# Patient Record
Sex: FEMALE | Race: WHITE | Employment: FULL TIME | URBAN - METROPOLITAN AREA
[De-identification: names, ages, dates, MRNs, and addresses within clinical notes are randomized per-mention and may not be internally consistent; named-entity substitution may affect disease eponyms.]

---

## 2017-01-09 DIAGNOSIS — J45.40 MODERATE PERSISTENT ASTHMA, UNCOMPLICATED: ICD-10-CM

## 2017-01-11 RX ORDER — FLUTICASONE PROPIONATE 220 UG/1
AEROSOL, METERED RESPIRATORY (INHALATION)
Qty: 1 INHALER | Refills: 4 | Status: SHIPPED | OUTPATIENT
Start: 2017-01-11 | End: 2017-07-15 | Stop reason: SDUPTHER

## 2017-02-21 ENCOUNTER — HOSPITAL ENCOUNTER (OUTPATIENT)
Dept: WOMENS IMAGING | Age: 52
Discharge: HOME | End: 2017-02-21
Attending: OBSTETRICS & GYNECOLOGY | Admitting: OBSTETRICS & GYNECOLOGY

## 2017-02-21 DIAGNOSIS — Z12.31 VISIT FOR SCREENING MAMMOGRAM: ICD-10-CM

## 2017-05-22 DIAGNOSIS — J45.40 MODERATE PERSISTENT ASTHMA, UNCOMPLICATED: ICD-10-CM

## 2017-06-11 DIAGNOSIS — L71.9 ROSACEA: ICD-10-CM

## 2017-06-12 RX ORDER — SPIRONOLACTONE 25 MG/1
TABLET ORAL
Qty: 30 TABLET | Refills: 1 | Status: SHIPPED | OUTPATIENT
Start: 2017-06-12 | End: 2017-07-15 | Stop reason: SDUPTHER

## 2017-07-15 ENCOUNTER — OFFICE VISIT (OUTPATIENT)
Dept: FAMILY MEDICINE CLINIC | Age: 52
End: 2017-07-15

## 2017-07-15 VITALS
RESPIRATION RATE: 16 BRPM | DIASTOLIC BLOOD PRESSURE: 80 MMHG | WEIGHT: 157 LBS | SYSTOLIC BLOOD PRESSURE: 122 MMHG | BODY MASS INDEX: 26.8 KG/M2 | HEART RATE: 84 BPM | TEMPERATURE: 98.5 F | HEIGHT: 64 IN

## 2017-07-15 DIAGNOSIS — J45.40 MODERATE PERSISTENT ASTHMA, UNCOMPLICATED: ICD-10-CM

## 2017-07-15 DIAGNOSIS — E78.1 HYPERTRIGLYCERIDEMIA: ICD-10-CM

## 2017-07-15 DIAGNOSIS — J45.40 MODERATE PERSISTENT ASTHMA WITHOUT COMPLICATION: ICD-10-CM

## 2017-07-15 DIAGNOSIS — H10.13 ALLERGIC CONJUNCTIVITIS OF BOTH EYES: ICD-10-CM

## 2017-07-15 DIAGNOSIS — Z00.00 WELL ADULT HEALTH CHECK: Primary | ICD-10-CM

## 2017-07-15 DIAGNOSIS — J30.9 ALLERGIC RHINITIS, UNSPECIFIED ALLERGIC RHINITIS TRIGGER, UNSPECIFIED RHINITIS SEASONALITY: ICD-10-CM

## 2017-07-15 DIAGNOSIS — L71.9 ROSACEA: ICD-10-CM

## 2017-07-15 DIAGNOSIS — Z11.59 NEED FOR HEPATITIS C SCREENING TEST: ICD-10-CM

## 2017-07-15 DIAGNOSIS — Z11.4 SCREENING FOR HIV (HUMAN IMMUNODEFICIENCY VIRUS): ICD-10-CM

## 2017-07-15 DIAGNOSIS — R63.5 WEIGHT GAIN: ICD-10-CM

## 2017-07-15 DIAGNOSIS — R13.14 PHARYNGOESOPHAGEAL DYSPHAGIA: ICD-10-CM

## 2017-07-15 PROCEDURE — 99396 PREV VISIT EST AGE 40-64: CPT | Performed by: FAMILY MEDICINE

## 2017-07-15 RX ORDER — FLUTICASONE PROPIONATE 220 UG/1
2 AEROSOL, METERED RESPIRATORY (INHALATION) 2 TIMES DAILY
Qty: 1 INHALER | Refills: 11 | Status: SHIPPED | OUTPATIENT
Start: 2017-07-15 | End: 2018-01-08

## 2017-07-15 RX ORDER — OMEPRAZOLE 40 MG/1
40 CAPSULE, DELAYED RELEASE ORAL DAILY
Qty: 90 CAPSULE | Refills: 3 | Status: SHIPPED | OUTPATIENT
Start: 2017-07-15 | End: 2018-02-16

## 2017-07-15 RX ORDER — SPIRONOLACTONE 25 MG/1
25 TABLET ORAL DAILY
Qty: 90 TABLET | Refills: 3 | Status: SHIPPED | OUTPATIENT
Start: 2017-07-15 | End: 2018-08-09 | Stop reason: SDUPTHER

## 2017-07-15 ASSESSMENT — PATIENT HEALTH QUESTIONNAIRE - PHQ9
2. FEELING DOWN, DEPRESSED OR HOPELESS: 1
1. LITTLE INTEREST OR PLEASURE IN DOING THINGS: 1
SUM OF ALL RESPONSES TO PHQ9 QUESTIONS 1 & 2: 2
SUM OF ALL RESPONSES TO PHQ QUESTIONS 1-9: 2

## 2017-08-01 DIAGNOSIS — E78.1 HYPERTRIGLYCERIDEMIA: ICD-10-CM

## 2017-08-01 DIAGNOSIS — R63.5 WEIGHT GAIN: ICD-10-CM

## 2017-08-01 DIAGNOSIS — Z11.59 NEED FOR HEPATITIS C SCREENING TEST: ICD-10-CM

## 2017-08-01 DIAGNOSIS — Z11.4 SCREENING FOR HIV (HUMAN IMMUNODEFICIENCY VIRUS): ICD-10-CM

## 2017-08-01 LAB
A/G RATIO: 1.6 (ref 1.1–2.2)
ALBUMIN SERPL-MCNC: 4.4 G/DL (ref 3.4–5)
ALP BLD-CCNC: 79 U/L (ref 40–129)
ALT SERPL-CCNC: 13 U/L (ref 10–40)
ANION GAP SERPL CALCULATED.3IONS-SCNC: 12 MMOL/L (ref 3–16)
AST SERPL-CCNC: 13 U/L (ref 15–37)
BILIRUB SERPL-MCNC: 0.4 MG/DL (ref 0–1)
BUN BLDV-MCNC: 14 MG/DL (ref 7–20)
CALCIUM SERPL-MCNC: 9.6 MG/DL (ref 8.3–10.6)
CHLORIDE BLD-SCNC: 101 MMOL/L (ref 99–110)
CHOLESTEROL, TOTAL: 217 MG/DL (ref 0–199)
CO2: 26 MMOL/L (ref 21–32)
CREAT SERPL-MCNC: 0.7 MG/DL (ref 0.6–1.1)
GFR AFRICAN AMERICAN: >60
GFR NON-AFRICAN AMERICAN: >60
GLOBULIN: 2.8 G/DL
GLUCOSE BLD-MCNC: 97 MG/DL (ref 70–99)
HDLC SERPL-MCNC: 48 MG/DL (ref 40–60)
HEPATITIS C ANTIBODY INTERPRETATION: NORMAL
LDL CHOLESTEROL CALCULATED: 138 MG/DL
POTASSIUM SERPL-SCNC: 5.1 MMOL/L (ref 3.5–5.1)
SODIUM BLD-SCNC: 139 MMOL/L (ref 136–145)
TOTAL PROTEIN: 7.2 G/DL (ref 6.4–8.2)
TRIGL SERPL-MCNC: 155 MG/DL (ref 0–150)
TSH SERPL DL<=0.05 MIU/L-ACNC: 1.6 UIU/ML (ref 0.27–4.2)
VLDLC SERPL CALC-MCNC: 31 MG/DL

## 2017-08-02 LAB — HIV-1 AND HIV-2 ANTIBODIES: NORMAL

## 2018-01-05 ENCOUNTER — TELEPHONE (OUTPATIENT)
Dept: FAMILY MEDICINE CLINIC | Age: 53
End: 2018-01-05

## 2018-02-15 DIAGNOSIS — R13.14 PHARYNGOESOPHAGEAL DYSPHAGIA: ICD-10-CM

## 2018-02-16 RX ORDER — OMEPRAZOLE 40 MG/1
CAPSULE, DELAYED RELEASE ORAL
Qty: 30 CAPSULE | Refills: 3 | Status: SHIPPED | OUTPATIENT
Start: 2018-02-16 | End: 2018-06-10 | Stop reason: SDUPTHER

## 2018-03-01 ENCOUNTER — TELEPHONE (OUTPATIENT)
Dept: FAMILY MEDICINE CLINIC | Age: 53
End: 2018-03-01

## 2018-04-19 ENCOUNTER — TELEPHONE (OUTPATIENT)
Dept: FAMILY MEDICINE CLINIC | Age: 53
End: 2018-04-19

## 2018-04-19 DIAGNOSIS — F41.9 ANXIETY: Primary | ICD-10-CM

## 2018-07-12 DIAGNOSIS — R13.14 PHARYNGOESOPHAGEAL DYSPHAGIA: ICD-10-CM

## 2018-07-12 RX ORDER — OMEPRAZOLE 40 MG/1
CAPSULE, DELAYED RELEASE ORAL
Qty: 30 CAPSULE | Refills: 0 | Status: SHIPPED | OUTPATIENT
Start: 2018-07-12 | End: 2018-08-09 | Stop reason: SDUPTHER

## 2018-08-09 DIAGNOSIS — R13.14 PHARYNGOESOPHAGEAL DYSPHAGIA: ICD-10-CM

## 2018-08-09 DIAGNOSIS — L71.9 ROSACEA: ICD-10-CM

## 2018-08-09 RX ORDER — SPIRONOLACTONE 25 MG/1
TABLET ORAL
Qty: 30 TABLET | Refills: 2 | Status: SHIPPED | OUTPATIENT
Start: 2018-08-09 | End: 2018-11-12 | Stop reason: SDUPTHER

## 2018-08-09 RX ORDER — OMEPRAZOLE 40 MG/1
CAPSULE, DELAYED RELEASE ORAL
Qty: 30 CAPSULE | Refills: 11 | Status: SHIPPED | OUTPATIENT
Start: 2018-08-09 | End: 2019-08-10 | Stop reason: SDUPTHER

## 2018-09-05 RX ORDER — FLUTICASONE FUROATE 200 UG/1
POWDER RESPIRATORY (INHALATION)
Qty: 30 EACH | Refills: 3 | Status: SHIPPED | OUTPATIENT
Start: 2018-09-05 | End: 2019-01-10 | Stop reason: SDUPTHER

## 2018-09-17 ENCOUNTER — OFFICE VISIT (OUTPATIENT)
Dept: FAMILY MEDICINE CLINIC | Age: 53
End: 2018-09-17

## 2018-09-17 VITALS
WEIGHT: 153 LBS | BODY MASS INDEX: 25.49 KG/M2 | RESPIRATION RATE: 16 BRPM | TEMPERATURE: 98.5 F | HEART RATE: 86 BPM | HEIGHT: 65 IN | DIASTOLIC BLOOD PRESSURE: 80 MMHG | SYSTOLIC BLOOD PRESSURE: 118 MMHG

## 2018-09-17 DIAGNOSIS — Z12.4 SCREENING FOR CERVICAL CANCER: ICD-10-CM

## 2018-09-17 DIAGNOSIS — E78.1 HYPERTRIGLYCERIDEMIA: ICD-10-CM

## 2018-09-17 DIAGNOSIS — Z23 NEEDS FLU SHOT: ICD-10-CM

## 2018-09-17 DIAGNOSIS — J45.40 MODERATE PERSISTENT ASTHMA WITHOUT COMPLICATION: ICD-10-CM

## 2018-09-17 DIAGNOSIS — Z00.00 WELL ADULT HEALTH CHECK: Primary | ICD-10-CM

## 2018-09-17 PROCEDURE — 99396 PREV VISIT EST AGE 40-64: CPT | Performed by: FAMILY MEDICINE

## 2018-09-17 ASSESSMENT — PATIENT HEALTH QUESTIONNAIRE - PHQ9
2. FEELING DOWN, DEPRESSED OR HOPELESS: 1
SUM OF ALL RESPONSES TO PHQ QUESTIONS 1-9: 2
1. LITTLE INTEREST OR PLEASURE IN DOING THINGS: 1
SUM OF ALL RESPONSES TO PHQ QUESTIONS 1-9: 2
SUM OF ALL RESPONSES TO PHQ9 QUESTIONS 1 & 2: 2

## 2018-09-17 NOTE — PROGRESS NOTES
155 (H) 0 - 150 mg/dL Final     Lab Results   Component Value Date    GLUCOSE 97 08/01/2017     Lab Results   Component Value Date     08/01/2017    K 5.1 08/01/2017    CREATININE 0.7 08/01/2017     Lab Results   Component Value Date    WBC 7.0 02/09/2012    HGB 14.6 02/09/2012    HCT 43.5 02/09/2012    MCV 91.7 02/09/2012     02/09/2012     Lab Results   Component Value Date    ALT 13 08/01/2017    AST 13 (L) 08/01/2017    ALKPHOS 79 08/01/2017    BILITOT 0.4 08/01/2017     TSH (uIU/mL)   Date Value   08/01/2017 1.60     No results found for: LABA1C  No results found for: PSA, PSADIA    PHYSICAL-VISIT NOTE   Subjective:     Chief Complaint   Patient presents with    Annual Exam       Camille Monet is a 46 y.o. female who presents for annual testing/preventive review and check-up of medical problems listed below:  1. Well adult health check    2. Hypertriglyceridemia    3. Moderate persistent asthma without complication    4. Screening for cervical cancer    5. Needs flu shot        Complaints: left ankle clicking with movement, mild discomfort  Left Ankle pain including foot makes a clicking sound x several months     Lump at the base of neck more prominent, no pain    Review of Systems  A comprehensive review of systems was negative with the EXCEPTION of notes above. See scanned in system review check-list.    Patient's medications, allergies, past medical, surgical, social and family histories were reviewed and updated as appropriate (see above). Objective:   PHYSICAL EXAM   /80 (Site: Right Upper Arm, Position: Sitting, Cuff Size: Large Adult)   Pulse 86   Temp 98.5 °F (36.9 °C) (Oral)   Resp 16   Ht 5' 4.5\" (1.638 m)   Wt 153 lb (69.4 kg)   LMP 09/13/2018   BMI 25.86 kg/m²   Blood pressure is Excellent. BP Readings from Last 5 Encounters:   09/17/18 118/80   07/15/17 122/80   10/17/16 124/72   06/28/16 139/88   06/27/16 118/78     Weight is decreased.    Wt Readings from Last 5 Encounters:   09/17/18 153 lb (69.4 kg)   07/15/17 157 lb (71.2 kg)   10/17/16 151 lb (68.5 kg)   07/08/16 150 lb (68 kg)   06/28/16 150 lb (68 kg)      GENERAL:   · well-developed, well-nourished, alert, no distress. EYES:   · External findings: lids and lashes normal and conjunctivae and sclerae normal  · Eyes: no periorbital cellulitis. ENT:   · External nose and ears appear normal  · normal TM's and external ear canals both ears  · Pharynx: normal. Exudates: None  · Lips, mucosa, and tongue normal  · Hearing grossly normal.     NECK:   · No adenopathy, supple, symmetrical, trachea midline  · Thyroid not enlarged, symmetric, no tenderness/mass/nodules  LYMPH:  · no cervical nodes, no supraclavicular nodes  LUNGS:    · Breathing unlabored  · clear to auscultation bilaterally and good air movement  CARDIOVASC:   · regular rate and rhythm, S1, S2 normal. No murmur, click, rub or gallop  · Apical impulse normal  · LEGS:  Lower extremity edema: none    · No carotid bruits  ABDOMEN:   · Soft, non-tender, no masses  · No hepatosplenomegaly  · No hernias noted. Exam limited by N/A  SKIN: warm and dry  · No rashes or suspicious lesions  · No nodules or induration  PSYCH:    · Alert and oriented  · Normal reasoning, insight good  · Facial expressions full, mood appropriate  · No memory disturbance noted  MUSCULOSKEL:    · Gait normal, assistive device: none  · No significant finger or nail findings  Spine symmetric, no deformities, no kyphosis      Assessment and Plan:      Diagnosis Orders   1. Well adult health check     2. Hypertriglyceridemia  Lipid Panel    COMPREHENSIVE METABOLIC PANEL   3. Moderate persistent asthma without complication     4. Screening for cervical cancer  HM PAP SMEAR   5. Needs flu shot     Previous labs reviewed as above. RISK FACTORS AND COUNSELLING  Behavioral Risks- :\"None identified\". Counseling provided on the following healthy behaviors: N/A.     INSTRUCTIONS  NEXT APPOINTMENT: Please schedule fasting annual physical (30 minutes) in one year. OK to have water, black coffee and medications (except for diabetes medicines). · PLEASE TAKE THIS FORM TO CHECK-OUT WINDOW TO SCHEDULE NEXT VISIT. PLEASE GET FASTING BLOODWORK DRAWN SOON. Lab is on first floor in [address]. Hours Monday to Friday 7 AM to 5 PM.  Take orders with you. RESULTS- most blood tests back in couple days. We will call you if any problems. If bloodwork good, you will get letter in mail or notified thru [address] (if signed up) within 2 weeks. If you do not, please call office. Please call gynecologist to schedule PAP smear. Ask GYN to fax result to Dr. Landry Dobbs at [phone]. · Please get flu vaccine when available in fall. Can get either at this office or at stores such as [employer] and [employer].

## 2018-09-17 NOTE — PATIENT INSTRUCTIONS
Gently arch backward and look up toward the ceiling. Repeat 10 times. Do this several times per day.

## 2018-10-05 DIAGNOSIS — E78.1 HYPERTRIGLYCERIDEMIA: ICD-10-CM

## 2018-10-05 LAB
A/G RATIO: 1.6 (ref 1.1–2.2)
ALBUMIN SERPL-MCNC: 4.7 G/DL (ref 3.4–5)
ALP BLD-CCNC: 86 U/L (ref 40–129)
ALT SERPL-CCNC: 23 U/L (ref 10–40)
ANION GAP SERPL CALCULATED.3IONS-SCNC: 12 MMOL/L (ref 3–16)
AST SERPL-CCNC: 18 U/L (ref 15–37)
BILIRUB SERPL-MCNC: 0.4 MG/DL (ref 0–1)
BUN BLDV-MCNC: 15 MG/DL (ref 7–20)
CALCIUM SERPL-MCNC: 9.8 MG/DL (ref 8.3–10.6)
CHLORIDE BLD-SCNC: 102 MMOL/L (ref 99–110)
CHOLESTEROL, TOTAL: 236 MG/DL (ref 0–199)
CO2: 26 MMOL/L (ref 21–32)
CREAT SERPL-MCNC: 0.7 MG/DL (ref 0.6–1.1)
GFR AFRICAN AMERICAN: >60
GFR NON-AFRICAN AMERICAN: >60
GLOBULIN: 2.9 G/DL
GLUCOSE BLD-MCNC: 104 MG/DL (ref 70–99)
HDLC SERPL-MCNC: 54 MG/DL (ref 40–60)
LDL CHOLESTEROL CALCULATED: 156 MG/DL
POTASSIUM SERPL-SCNC: 4.6 MMOL/L (ref 3.5–5.1)
SODIUM BLD-SCNC: 140 MMOL/L (ref 136–145)
TOTAL PROTEIN: 7.6 G/DL (ref 6.4–8.2)
TRIGL SERPL-MCNC: 132 MG/DL (ref 0–150)
VLDLC SERPL CALC-MCNC: 26 MG/DL

## 2018-10-06 PROBLEM — R73.9 HYPERGLYCEMIA: Status: ACTIVE | Noted: 2018-10-06

## 2018-11-12 DIAGNOSIS — L71.9 ROSACEA: ICD-10-CM

## 2018-11-13 RX ORDER — SPIRONOLACTONE 25 MG/1
TABLET ORAL
Qty: 30 TABLET | Refills: 1 | Status: SHIPPED | OUTPATIENT
Start: 2018-11-13 | End: 2019-01-10 | Stop reason: SDUPTHER

## 2019-01-10 DIAGNOSIS — L71.9 ROSACEA: ICD-10-CM

## 2019-01-11 RX ORDER — FLUTICASONE FUROATE 200 UG/1
POWDER RESPIRATORY (INHALATION)
Qty: 30 EACH | Refills: 2 | Status: SHIPPED | OUTPATIENT
Start: 2019-01-11 | End: 2019-05-13 | Stop reason: SDUPTHER

## 2019-01-11 RX ORDER — SPIRONOLACTONE 25 MG/1
TABLET ORAL
Qty: 30 TABLET | Refills: 0 | Status: SHIPPED | OUTPATIENT
Start: 2019-01-11 | End: 2019-02-10 | Stop reason: SDUPTHER

## 2019-02-10 DIAGNOSIS — L71.9 ROSACEA: ICD-10-CM

## 2019-02-11 RX ORDER — SPIRONOLACTONE 25 MG/1
TABLET ORAL
Qty: 30 TABLET | Refills: 0 | Status: SHIPPED | OUTPATIENT
Start: 2019-02-11 | End: 2019-03-10 | Stop reason: SDUPTHER

## 2019-03-04 ENCOUNTER — HOSPITAL ENCOUNTER (OUTPATIENT)
Dept: WOMENS IMAGING | Age: 54
Discharge: HOME | End: 2019-03-04
Payer: COMMERCIAL

## 2019-03-04 DIAGNOSIS — Z12.31 VISIT FOR SCREENING MAMMOGRAM: ICD-10-CM

## 2019-03-04 PROCEDURE — 77067 SCR MAMMO BI INCL CAD: CPT

## 2019-03-10 DIAGNOSIS — L71.9 ROSACEA: ICD-10-CM

## 2019-03-12 RX ORDER — SPIRONOLACTONE 25 MG/1
TABLET ORAL
Qty: 90 TABLET | Refills: 1 | Status: SHIPPED | OUTPATIENT
Start: 2019-03-12 | End: 2019-08-10 | Stop reason: SDUPTHER

## 2019-05-13 RX ORDER — FLUTICASONE FUROATE 200 UG/1
POWDER RESPIRATORY (INHALATION)
Qty: 1 EACH | Refills: 3 | Status: SHIPPED | OUTPATIENT
Start: 2019-05-13 | End: 2019-10-19 | Stop reason: SDUPTHER

## 2019-08-10 DIAGNOSIS — R13.14 PHARYNGOESOPHAGEAL DYSPHAGIA: ICD-10-CM

## 2019-08-10 DIAGNOSIS — L71.9 ROSACEA: ICD-10-CM

## 2019-08-12 RX ORDER — SPIRONOLACTONE 25 MG/1
TABLET ORAL
Qty: 90 TABLET | Refills: 0 | Status: SHIPPED | OUTPATIENT
Start: 2019-08-12 | End: 2019-11-15 | Stop reason: SDUPTHER

## 2019-08-12 RX ORDER — OMEPRAZOLE 40 MG/1
CAPSULE, DELAYED RELEASE ORAL
Qty: 30 CAPSULE | Refills: 0 | Status: SHIPPED | OUTPATIENT
Start: 2019-08-12 | End: 2019-09-08 | Stop reason: SDUPTHER

## 2019-09-08 DIAGNOSIS — R13.14 PHARYNGOESOPHAGEAL DYSPHAGIA: ICD-10-CM

## 2019-09-09 RX ORDER — OMEPRAZOLE 40 MG/1
CAPSULE, DELAYED RELEASE ORAL
Qty: 90 CAPSULE | Refills: 0 | Status: SHIPPED | OUTPATIENT
Start: 2019-09-09 | End: 2019-12-24 | Stop reason: SDUPTHER

## 2019-10-21 RX ORDER — FLUTICASONE FUROATE 200 UG/1
POWDER RESPIRATORY (INHALATION)
Qty: 30 EACH | Refills: 2 | Status: SHIPPED | OUTPATIENT
Start: 2019-10-21 | End: 2019-11-15 | Stop reason: SDUPTHER

## 2019-11-15 ENCOUNTER — OFFICE VISIT (OUTPATIENT)
Dept: FAMILY MEDICINE CLINIC | Age: 54
End: 2019-11-15
Payer: COMMERCIAL

## 2019-11-15 VITALS
BODY MASS INDEX: 25.49 KG/M2 | HEIGHT: 65 IN | RESPIRATION RATE: 16 BRPM | DIASTOLIC BLOOD PRESSURE: 80 MMHG | SYSTOLIC BLOOD PRESSURE: 120 MMHG | HEART RATE: 80 BPM | WEIGHT: 153 LBS

## 2019-11-15 DIAGNOSIS — Z00.00 WELL ADULT HEALTH CHECK: Primary | ICD-10-CM

## 2019-11-15 DIAGNOSIS — L71.9 ROSACEA: ICD-10-CM

## 2019-11-15 DIAGNOSIS — R73.9 HYPERGLYCEMIA: ICD-10-CM

## 2019-11-15 DIAGNOSIS — G47.9 SLEEP DISORDER: ICD-10-CM

## 2019-11-15 DIAGNOSIS — R40.0 DAYTIME SOMNOLENCE: ICD-10-CM

## 2019-11-15 DIAGNOSIS — Z23 NEED FOR ZOSTER VACCINATION: ICD-10-CM

## 2019-11-15 DIAGNOSIS — M54.31 SCIATICA, RIGHT SIDE: ICD-10-CM

## 2019-11-15 DIAGNOSIS — M53.3 PAIN OF RIGHT SACROILIAC JOINT: ICD-10-CM

## 2019-11-15 DIAGNOSIS — E78.1 HYPERTRIGLYCERIDEMIA: ICD-10-CM

## 2019-11-15 DIAGNOSIS — J45.40 MODERATE PERSISTENT ASTHMA, UNCOMPLICATED: ICD-10-CM

## 2019-11-15 LAB
A/G RATIO: 2.4 (ref 1.1–2.2)
ALBUMIN SERPL-MCNC: 5.3 G/DL (ref 3.4–5)
ALP BLD-CCNC: 90 U/L (ref 40–129)
ALT SERPL-CCNC: 21 U/L (ref 10–40)
ANION GAP SERPL CALCULATED.3IONS-SCNC: 15 MMOL/L (ref 3–16)
AST SERPL-CCNC: 20 U/L (ref 15–37)
BILIRUB SERPL-MCNC: 0.3 MG/DL (ref 0–1)
BUN BLDV-MCNC: 14 MG/DL (ref 7–20)
CALCIUM SERPL-MCNC: 10 MG/DL (ref 8.3–10.6)
CHLORIDE BLD-SCNC: 101 MMOL/L (ref 99–110)
CHOLESTEROL, TOTAL: 252 MG/DL (ref 0–199)
CO2: 26 MMOL/L (ref 21–32)
CREAT SERPL-MCNC: 0.7 MG/DL (ref 0.6–1.1)
GFR AFRICAN AMERICAN: >60
GFR NON-AFRICAN AMERICAN: >60
GLOBULIN: 2.2 G/DL
GLUCOSE BLD-MCNC: 106 MG/DL (ref 70–99)
HDLC SERPL-MCNC: 52 MG/DL (ref 40–60)
LDL CHOLESTEROL CALCULATED: 161 MG/DL
POTASSIUM SERPL-SCNC: 4.4 MMOL/L (ref 3.5–5.1)
SODIUM BLD-SCNC: 142 MMOL/L (ref 136–145)
TOTAL PROTEIN: 7.5 G/DL (ref 6.4–8.2)
TRIGL SERPL-MCNC: 193 MG/DL (ref 0–150)
VLDLC SERPL CALC-MCNC: 39 MG/DL

## 2019-11-15 PROCEDURE — 99396 PREV VISIT EST AGE 40-64: CPT | Performed by: FAMILY MEDICINE

## 2019-11-15 RX ORDER — ALBUTEROL SULFATE 90 UG/1
AEROSOL, METERED RESPIRATORY (INHALATION)
Qty: 1 INHALER | Refills: 1 | Status: SHIPPED | OUTPATIENT
Start: 2019-11-15 | End: 2020-08-24 | Stop reason: SDUPTHER

## 2019-11-15 RX ORDER — SPIRONOLACTONE 25 MG/1
TABLET ORAL
Qty: 90 TABLET | Refills: 0 | Status: SHIPPED | OUTPATIENT
Start: 2019-11-15 | End: 2020-01-15 | Stop reason: SDUPTHER

## 2019-11-15 ASSESSMENT — PATIENT HEALTH QUESTIONNAIRE - PHQ9
SUM OF ALL RESPONSES TO PHQ9 QUESTIONS 1 & 2: 2
SUM OF ALL RESPONSES TO PHQ QUESTIONS 1-9: 2
SUM OF ALL RESPONSES TO PHQ QUESTIONS 1-9: 2
2. FEELING DOWN, DEPRESSED OR HOPELESS: 1
1. LITTLE INTEREST OR PLEASURE IN DOING THINGS: 1

## 2019-11-16 LAB
ESTIMATED AVERAGE GLUCOSE: 105.4 MG/DL
HBA1C MFR BLD: 5.3 %

## 2019-11-21 ENCOUNTER — OFFICE VISIT (OUTPATIENT)
Dept: SLEEP MEDICINE | Age: 54
End: 2019-11-21
Payer: COMMERCIAL

## 2019-11-21 VITALS
OXYGEN SATURATION: 100 % | BODY MASS INDEX: 26.12 KG/M2 | HEART RATE: 91 BPM | HEIGHT: 64 IN | TEMPERATURE: 98.6 F | SYSTOLIC BLOOD PRESSURE: 132 MMHG | WEIGHT: 153 LBS | DIASTOLIC BLOOD PRESSURE: 92 MMHG | RESPIRATION RATE: 16 BRPM

## 2019-11-21 DIAGNOSIS — G47.33 OBSTRUCTIVE SLEEP APNEA: Primary | ICD-10-CM

## 2019-11-21 DIAGNOSIS — J45.40 MODERATE PERSISTENT ASTHMA WITHOUT COMPLICATION: ICD-10-CM

## 2019-11-21 PROCEDURE — 99204 OFFICE O/P NEW MOD 45 MIN: CPT | Performed by: PSYCHIATRY & NEUROLOGY

## 2019-11-21 ASSESSMENT — SLEEP AND FATIGUE QUESTIONNAIRES
HOW LIKELY ARE YOU TO NOD OFF OR FALL ASLEEP WHEN YOU ARE A PASSENGER IN A CAR FOR AN HOUR WITHOUT A BREAK: 0
HOW LIKELY ARE YOU TO NOD OFF OR FALL ASLEEP WHILE SITTING AND READING: 2
HOW LIKELY ARE YOU TO NOD OFF OR FALL ASLEEP WHILE SITTING QUIETLY AFTER LUNCH WITHOUT ALCOHOL: 1
ESS TOTAL SCORE: 9
HOW LIKELY ARE YOU TO NOD OFF OR FALL ASLEEP WHILE SITTING INACTIVE IN A PUBLIC PLACE: 1
HOW LIKELY ARE YOU TO NOD OFF OR FALL ASLEEP WHILE SITTING AND TALKING TO SOMEONE: 0
HOW LIKELY ARE YOU TO NOD OFF OR FALL ASLEEP WHILE WATCHING TV: 2
HOW LIKELY ARE YOU TO NOD OFF OR FALL ASLEEP WHILE LYING DOWN TO REST IN THE AFTERNOON WHEN CIRCUMSTANCES PERMIT: 3
NECK CIRCUMFERENCE (INCHES): 15.5
HOW LIKELY ARE YOU TO NOD OFF OR FALL ASLEEP IN A CAR, WHILE STOPPED FOR A FEW MINUTES IN TRAFFIC: 0

## 2019-11-21 ASSESSMENT — ENCOUNTER SYMPTOMS
GASTROINTESTINAL NEGATIVE: 1
ALLERGIC/IMMUNOLOGIC NEGATIVE: 1
CHOKING: 1

## 2019-12-03 ENCOUNTER — TELEPHONE (OUTPATIENT)
Dept: SLEEP CENTER | Age: 54
End: 2019-12-03

## 2019-12-10 ENCOUNTER — PATIENT MESSAGE (OUTPATIENT)
Dept: FAMILY MEDICINE CLINIC | Age: 54
End: 2019-12-10

## 2019-12-24 RX ORDER — OMEPRAZOLE 40 MG/1
CAPSULE, DELAYED RELEASE ORAL
Qty: 90 CAPSULE | Refills: 1 | Status: SHIPPED | OUTPATIENT
Start: 2019-12-24 | End: 2020-07-14 | Stop reason: SDUPTHER

## 2019-12-30 ENCOUNTER — TELEPHONE (OUTPATIENT)
Dept: FAMILY MEDICINE CLINIC | Age: 54
End: 2019-12-30

## 2020-01-13 ENCOUNTER — PATIENT MESSAGE (OUTPATIENT)
Dept: FAMILY MEDICINE CLINIC | Age: 55
End: 2020-01-13

## 2020-01-14 NOTE — TELEPHONE ENCOUNTER
From: Floresita Soliman  To: Riya Gunderson MD  Sent: 1/13/2020 8:05 PM EST  Subject: Prescription Question    Hi Dr. Leopold Nicolas. I have an insurance problem with Jamaal Argueta. Im told that it has been bumped to a higher tier and [employer] cover it. They recommended covered alternatives Budesoride or Asmanex. I have been happy with the Jamaal Argueta and have barely had to use the emergency inhaler while on it, so if there is a way to keep me covered for that drug, I would be happy, assuming the cost isnt exorbitant for the higher tier. Please advise what you think, as Ill need to get that taken care of soon. Additionally, Merissa Horton is requiring everyone to switch to [facility], and also they will only fill the spiranolactone prescript for a 3-month supply. Please contact [facility] at [phone] to fill that. Thanks much.

## 2020-01-15 RX ORDER — SPIRONOLACTONE 25 MG/1
TABLET ORAL
Qty: 90 TABLET | Refills: 1 | Status: SHIPPED | OUTPATIENT
Start: 2020-01-15 | End: 2020-07-02

## 2020-01-16 NOTE — TELEPHONE ENCOUNTER
The pt called  And said that she will like Asmanex so this can go to her [facility] pharmacy. Please advise,    Thanks.

## 2020-01-30 ENCOUNTER — PATIENT MESSAGE (OUTPATIENT)
Dept: FAMILY MEDICINE CLINIC | Age: 55
End: 2020-01-30

## 2020-02-03 RX ORDER — NEBULIZER ACCESSORIES
1 KIT MISCELLANEOUS 2 TIMES DAILY
Qty: 1 KIT | Refills: 0 | Status: SHIPPED | OUTPATIENT
Start: 2020-02-03 | End: 2020-02-04 | Stop reason: SDUPTHER

## 2020-02-03 RX ORDER — BUDESONIDE 0.25 MG/2ML
250 INHALANT ORAL 2 TIMES DAILY
Qty: 60 AMPULE | Refills: 3 | Status: SHIPPED | OUTPATIENT
Start: 2020-02-03 | End: 2020-02-04 | Stop reason: SDUPTHER

## 2020-02-11 ENCOUNTER — OFFICE VISIT (OUTPATIENT)
Dept: FAMILY MEDICINE CLINIC | Age: 55
End: 2020-02-11
Payer: COMMERCIAL

## 2020-02-11 VITALS
WEIGHT: 156 LBS | HEIGHT: 64 IN | HEART RATE: 86 BPM | OXYGEN SATURATION: 97 % | DIASTOLIC BLOOD PRESSURE: 86 MMHG | BODY MASS INDEX: 26.63 KG/M2 | SYSTOLIC BLOOD PRESSURE: 126 MMHG

## 2020-02-11 PROCEDURE — 99214 OFFICE O/P EST MOD 30 MIN: CPT | Performed by: NURSE PRACTITIONER

## 2020-02-11 ASSESSMENT — PATIENT HEALTH QUESTIONNAIRE - PHQ9
1. LITTLE INTEREST OR PLEASURE IN DOING THINGS: 0
SUM OF ALL RESPONSES TO PHQ QUESTIONS 1-9: 1
SUM OF ALL RESPONSES TO PHQ QUESTIONS 1-9: 1
SUM OF ALL RESPONSES TO PHQ9 QUESTIONS 1 & 2: 1
2. FEELING DOWN, DEPRESSED OR HOPELESS: 1

## 2020-02-11 ASSESSMENT — ENCOUNTER SYMPTOMS
COUGH: 1
SHORTNESS OF BREATH: 1
NAUSEA: 0
DIARRHEA: 0
WHEEZING: 1
VOMITING: 0

## 2020-02-11 NOTE — PROGRESS NOTES
2/11/2020    This is a 53 y.o. female   Chief Complaint   Patient presents with    Other     lt elbow pain x 2 weeks, questions about asthma meds   . Arm Pain    There was no injury mechanism. The pain is present in the left elbow. The quality of the pain is described as aching. The pain does not radiate. The pain is at a severity of 4/10. The pain is moderate. The pain has been fluctuating since the incident. Associated symptoms include tingling. Pertinent negatives include no chest pain or numbness. The symptoms are aggravated by movement, lifting and palpation. She has tried NSAIDs for the symptoms. The treatment provided mild relief. Denies injury to left elbow. Feels that there is weakness in left hand. Some tingling. Pain is better today. Pain is more on lateral side. She is right handed. Works on a computer daily. Pain currently is 1-2/10. Worse pain is 4/10. This will happen with picking up an object or with twisting. Has taken aleve 220 mg qhs without improvement in symptoms. Has been on pulmicort hand held inhaler 2 puffs BID for the past couple of days. Has been off maintenance inhaler for the past 10 days. Has been having to use the albuterol inhaler 4 times a day. When she was taking the Arnuity inhaler she very rarely used the albuterol inhaler. She had to stop using the arnuity inhaler due to cost of medication.         Patient Active Problem List   Diagnosis    Hemorrhoids, internal    Allergic conjunctivitis of both eyes    Hypertriglyceridemia    Rosacea    Moderate persistent asthma    Abnormal EKG- septal Q, no symptoms    Dysphagia- EGD with striations about 2009    Allergic rhinitis    Chronic low back pain    Trochanteric bursitis of left hip    Digital mucous cyst    Hyperglycemia    Asthma          Current Outpatient Medications   Medication Sig Dispense Refill    spironolactone (ALDACTONE) 25 MG tablet TAKE ONE TABLET BY MOUTH DAILY 90 tablet 1   

## 2020-02-11 NOTE — PATIENT INSTRUCTIONS
Can take Aleve two tabs twice daily with food. Ice elbow as needed. Patient Education        Tennis Elbow: Exercises  Introduction  Here are some examples of exercises for you to try. The exercises may be suggested for a condition or for rehabilitation. Start each exercise slowly. Ease off the exercises if you start to have pain. You will be told when to start these exercises and which ones will work best for you. How to do the exercises  Wrist flexor stretch   1. Extend your arm in front of you with your palm up. 2. Bend your wrist, pointing your hand toward the floor. 3. With your other hand, gently bend your wrist farther until you feel a mild to moderate stretch in your forearm. 4. Hold for at least 15 to 30 seconds. Repeat 2 to 4 times. Wrist extensor stretch   1. Repeat steps 1 to 4 of the stretch above but begin with your extended hand palm down. Ball or sock squeeze   1. Hold a tennis ball (or a rolled-up sock) in your hand. 2. Make a fist around the ball (or sock) and squeeze. 3. Hold for about 6 seconds, and then relax for up to 10 seconds. 4. Repeat 8 to 12 times. 5. Switch the ball (or sock) to your other hand and do 8 to 12 times. Wrist deviation   1. Sit so that your arm is supported but your hand hangs off the edge of a flat surface, such as a table. 2. Hold your hand out like you are shaking hands with someone. 3. Move your hand up and down. 4. Repeat this motion 8 to 12 times. 5. Switch arms. 6. Try to do this exercise twice with each hand. Wrist curls   1. Place your forearm on a table with your hand hanging over the edge of the table, palm up. 2. Place a 1- to 2-pound weight in your hand. This may be a dumbbell, a can of food, or a filled water bottle. 3. Slowly raise and lower the weight while keeping your forearm on the table and your palm facing up. 4. Repeat this motion 8 to 12 times.   5. Switch arms, and do steps 1 through 4.  6. Repeat with your hand facing down toward the floor. Switch arms. Biceps curls   1. Sit leaning forward with your legs slightly spread and your left hand on your left thigh. 2. Place your right elbow on your right thigh, and hold the weight with your forearm horizontal.  3. Slowly curl the weight up and toward your chest.  4. Repeat this motion 8 to 12 times. 5. Switch arms, and do steps 1 through 4. Follow-up care is a key part of your treatment and safety. Be sure to make and go to all appointments, and call your doctor if you are having problems. It's also a good idea to know your test results and keep a list of the medicines you take. Where can you learn more? Go to [email or web address] and sign in to your [employer] account. Enter E127 in the [employer] box to learn more about \"Tennis Elbow: Exercises. \"     If you do not have an account, please click on the \"Sign Up Now\" link. Current as of: June 26, 2019  Content Version: 12.3  © 2997-9699 Healthwise, Incorporated. Care instructions adapted under license by [facility] [place] ([facility]). If you have questions about a medical condition or this instruction, always ask your healthcare professional. [address] any warranty or liability for your use of this information.

## 2020-03-03 ENCOUNTER — HOSPITAL ENCOUNTER (OUTPATIENT)
Dept: WOMENS IMAGING | Age: 55
Discharge: HOME | End: 2020-03-03
Payer: COMMERCIAL

## 2020-03-03 PROCEDURE — 77067 SCR MAMMO BI INCL CAD: CPT

## 2020-03-30 ENCOUNTER — NURSE TRIAGE (OUTPATIENT)
Dept: OTHER | Facility: CLINIC | Age: 55
End: 2020-03-30

## 2020-03-30 NOTE — TELEPHONE ENCOUNTER
menstrual period? \"        No   11. TRAVEL: \"Have you traveled out of the country in the last month? \" (e.g., travel history, exposures)        no    Protocols used: BREATHING DIFFICULTY-ADULT-AH    Caller reports symptoms as documented above. Caller informed of disposition. Caller educated on Sofea sarahi/web site and/or evisit, as well as precautions/social distancing/hand hygiene. Care advice as documented. Please do not respond to the triage nurse through this encounter. Any subsequent communication should be directly with the patient.

## 2020-03-31 ENCOUNTER — TELEPHONE (OUTPATIENT)
Dept: FAMILY MEDICINE CLINIC | Age: 55
End: 2020-03-31

## 2020-04-01 ENCOUNTER — OFFICE VISIT (OUTPATIENT)
Dept: PRIMARY CARE CLINIC | Age: 55
End: 2020-04-01
Payer: COMMERCIAL

## 2020-04-01 VITALS — TEMPERATURE: 98.1 F | OXYGEN SATURATION: 98 % | HEART RATE: 94 BPM

## 2020-04-01 LAB
INFLUENZA A ANTIGEN, POC: NEGATIVE
INFLUENZA B ANTIGEN, POC: NEGATIVE

## 2020-04-01 PROCEDURE — 99212 OFFICE O/P EST SF 10 MIN: CPT | Performed by: NURSE PRACTITIONER

## 2020-04-01 PROCEDURE — 87804 INFLUENZA ASSAY W/OPTIC: CPT | Performed by: NURSE PRACTITIONER

## 2020-07-02 RX ORDER — SPIRONOLACTONE 25 MG/1
TABLET ORAL
Qty: 90 TABLET | Refills: 0 | Status: SHIPPED | OUTPATIENT
Start: 2020-07-02 | End: 2020-09-28

## 2020-07-14 ENCOUNTER — PATIENT MESSAGE (OUTPATIENT)
Dept: FAMILY MEDICINE CLINIC | Age: 55
End: 2020-07-14

## 2020-07-14 RX ORDER — OMEPRAZOLE 40 MG/1
CAPSULE, DELAYED RELEASE ORAL
Qty: 90 CAPSULE | Refills: 0 | Status: SHIPPED | OUTPATIENT
Start: 2020-07-14 | End: 2020-10-14 | Stop reason: SDUPTHER

## 2020-07-14 NOTE — TELEPHONE ENCOUNTER
From: Noe Singh  To: Hollie Rowan MD  Sent: 7/14/2020 9:14 AM EDT  Subject: Prescription Question    I just sent a message to Alice Blankenship with regard to my need for a refill of the omeprazole prescription. I believe my pharmacy, [facility]. ([phone]), has twice requested a refill on my behalf but has not received a response. In looking at my list of prescriptions, I see that somehow they still are listing [facility] as my pharmacy, although I have not been there since 2019. I'm not sure how to correct that error in this portal. Can you direct someone to make those changes, and have the omeprazole prescription refill called in to [facility]?     Thanks. I hope you are well.

## 2020-08-22 ENCOUNTER — PATIENT MESSAGE (OUTPATIENT)
Dept: FAMILY MEDICINE CLINIC | Age: 55
End: 2020-08-22

## 2020-08-24 RX ORDER — ALBUTEROL SULFATE 90 UG/1
AEROSOL, METERED RESPIRATORY (INHALATION)
Qty: 1 INHALER | Refills: 2 | Status: SHIPPED | OUTPATIENT
Start: 2020-08-24 | End: 2020-12-21 | Stop reason: SDUPTHER

## 2020-08-24 RX ORDER — FLUTICASONE FUROATE 200 UG/1
1 POWDER RESPIRATORY (INHALATION) DAILY
Qty: 1 EACH | Refills: 2 | Status: SHIPPED | OUTPATIENT
Start: 2020-08-24 | End: 2020-12-21 | Stop reason: SDUPTHER

## 2020-08-24 NOTE — TELEPHONE ENCOUNTER
From: Janessa Velásquez  To: Dylan Roger MD  Sent: 8/22/2020 12:19 PM EDT  Subject: Prescription Question    Hi dr Reta Cooks. I need a refill for my two asthma meds, arnuity ellipta and the albuterol. I noted that the pharmacy listed under the albuterol prescription was my old [facility]. Im [facility] Thanks.  Hope you and your family are well

## 2020-09-28 RX ORDER — SPIRONOLACTONE 25 MG/1
TABLET ORAL
Qty: 90 TABLET | Refills: 0 | Status: SHIPPED | OUTPATIENT
Start: 2020-09-28 | End: 2020-12-21 | Stop reason: SDUPTHER

## 2020-10-14 RX ORDER — OMEPRAZOLE 40 MG/1
CAPSULE, DELAYED RELEASE ORAL
Qty: 90 CAPSULE | Refills: 0 | Status: SHIPPED | OUTPATIENT
Start: 2020-10-14 | End: 2020-12-21 | Stop reason: SDUPTHER

## 2020-12-16 ENCOUNTER — HOSPITAL ENCOUNTER (OUTPATIENT)
Age: 55
Discharge: HOME | End: 2020-12-16
Payer: COMMERCIAL

## 2020-12-16 ENCOUNTER — OFFICE VISIT (OUTPATIENT)
Dept: FAMILY MEDICINE CLINIC | Age: 55
End: 2020-12-16
Payer: COMMERCIAL

## 2020-12-16 ENCOUNTER — HOSPITAL ENCOUNTER (OUTPATIENT)
Dept: GENERAL RADIOLOGY | Age: 55
Discharge: HOME | End: 2020-12-16
Payer: COMMERCIAL

## 2020-12-16 VITALS
HEIGHT: 64 IN | RESPIRATION RATE: 16 BRPM | OXYGEN SATURATION: 100 % | HEART RATE: 86 BPM | WEIGHT: 158 LBS | TEMPERATURE: 97.9 F | BODY MASS INDEX: 26.98 KG/M2 | SYSTOLIC BLOOD PRESSURE: 130 MMHG | DIASTOLIC BLOOD PRESSURE: 84 MMHG

## 2020-12-16 PROCEDURE — 99396 PREV VISIT EST AGE 40-64: CPT | Performed by: FAMILY MEDICINE

## 2020-12-16 PROCEDURE — 90471 IMMUNIZATION ADMIN: CPT | Performed by: FAMILY MEDICINE

## 2020-12-16 PROCEDURE — 72100 X-RAY EXAM L-S SPINE 2/3 VWS: CPT

## 2020-12-16 PROCEDURE — 90750 HZV VACC RECOMBINANT IM: CPT | Performed by: FAMILY MEDICINE

## 2020-12-16 RX ORDER — DEXAMETHASONE 4 MG/1
TABLET ORAL
Qty: 2 TABLET | Refills: 0 | Status: SHIPPED | OUTPATIENT
Start: 2020-12-16 | End: 2021-01-06 | Stop reason: SDUPTHER

## 2020-12-16 RX ORDER — FLUTICASONE FUROATE 200 UG/1
POWDER RESPIRATORY (INHALATION)
COMMUNITY
End: 2020-12-21 | Stop reason: SDUPTHER

## 2020-12-16 NOTE — PATIENT INSTRUCTIONS
INSTRUCTIONS  · NEXT APPOINTMENT: Please schedule check-up in 6 months. · PLEASE TAKE THIS FORM TO CHECK-OUT WINDOW TO SCHEDULE NEXT VISIT. Take dexamethasone at 11 PM. Get fasting blood work at 8 AM next day. PLEASE GET FASTING BLOODWORK DRAWN SOON. Lab is on first floor in [address]. Hours Monday to Friday 7 AM to 5 PM.   · Get x-ray. [facility] to get the x-ray. No appointment needed. · Please get flu vaccine when available in fall. Can get either at this office or at stores such as [employer] and [employer]. · Ask GYN to fax PAP result to Dr. Mario Ledesma at [phone]. · Schedule lab visit in 2-3 months for shingrix #2. · Eat less, move more! You can do it! · Go to PT  · May take 2 aleve every 12 hours. May also take acetaminophen (Tylenol) as instructed on packaging. Patient Education     TIPS ON WEIGHT LOSS    Weight loss maintenance is considered successful if you lose at least 10 percent of your body weight and keep that weight off for at least one year. Ideas for better weight control. Try implementing one a week. · Drink only sugar free beverages. · Drink at least 8 cups per day. · Do not eat after 7 PM.  · Snack every 2 hours during the day on 100 calorie snacks (apple, strawberry, almonds, pistachio, walnuts, cheese cubes, raw veggies like bell peppers, tomato, celery, zucchini, snow peas, broccoli). · At meals, limit portion sizes to what you could hold in your hand of a meat. · Eat all of the raw vegetables and salads that you want with vinegar or low serge dressing. · Sleep 8 hours at night. · Minimize white starches- bread, pasta, rice potatoes. Try high fiber cereal, breads, granola. · Move more- try walking 15 minutes per day. · Use small plates and bowls to make serving look bigger. · Keeping track of calories and fat grams.   Try cell phone sarahi called \"Lose-it\"  · Planning your meals ahead of time  · Eating breakfast every day · Keeping your diet steady. Eating the same on weekends,vacations and special occasions. · Watching less than 10 hours of television per week    Should I take weight loss medicines? Taking medicines may work better than diet and exercise alone for some people. OTC orlistat (brand: Raydell Sam) can help weight loss. A multivitamin must be taken every day to prevent vitamin deficiencies. Many people regain weight after they stop taking these medicines. Should I have weight loss surgery? Surgery can help with long-term weight loss maintenance, BUT people who make major lifestyle changes can get the same results.

## 2020-12-16 NOTE — PROGRESS NOTES
PHYSICAL-VISIT NOTE   Subjective:     Chief Complaint   Patient presents with    Annual Exam       Dona Daniel is a 63 y.o. female who presents for annual testing/preventive review and check-up of medical problems listed below:  1. Annual physical exam    2. Hypertriglyceridemia    3. Hyperglycemia    4. Moderate persistent asthma without complication    5. Need for zoster vaccination    6. Low back pain radiating to right leg    7. Weight gain        Complaints: pt is having issues with chronic right low back pain and it's affecting her sleeping. Worse laying down. Ache in [place] with prolonged sit. R post leg intermittent tingle. · The inhaler she is on has her asthma under control but its really expensive she wonders if it's causing some of her muscle issues. · Weight gain through her neck area she also self diagnosed her self with cushing syndrome. · Noticing gradual wt gain since menopause    Health Maintenance Due   Topic Date Due    Shingles Vaccine (2 of 3) 10/16/2014    Cervical cancer screen  02/01/2017    Potassium monitoring  11/15/2020    Creatinine monitoring  11/15/2020     ROS  See scanned \"Annual Adult Health Checklist\" reviewed by Geena Russell MD. Pertinent positives addressed above. *Chief complaint, HPI and History provided by the medical assistant has been reviewed and verified by provider- Geena Russell MD.    HISTORY:  Patient's medications, allergies, past medical, and social histories were reviewed and updated as appropriate.      CHART REVIEW  Health Maintenance   Topic Date Due    Shingles Vaccine (2 of 3) 10/16/2014    Cervical cancer screen  02/01/2017    Potassium monitoring  11/15/2020    Creatinine monitoring  11/15/2020    Breast cancer screen  03/03/2022    Diabetes screen  11/15/2022    DTaP/Tdap/Td vaccine (2 - Td) 04/18/2023    Colon cancer screen colonoscopy  07/18/2024    Lipid screen  11/15/2024    Flu vaccine  Completed  Pneumococcal 0-64 years Vaccine  Completed    Hepatitis C screen  Completed    HIV screen  Completed    Hepatitis A vaccine  Aged Out    Hepatitis B vaccine  Aged Out    Hib vaccine  Aged Out    Meningococcal (ACWY) vaccine  Aged Out     The 10-year ASCVD risk score (Chad Mathew, et al., 2013) is: 3%    Values used to calculate the score:      Age: 63 years      Sex: Female      Is Non- : No      Diabetic: No      Tobacco smoker: No      Systolic Blood Pressure: 096 mmHg      Is BP treated: No      HDL Cholesterol: 52 mg/dL      Total Cholesterol: 252 mg/dL  Prior to Visit Medications    Medication Sig Taking? Authorizing Provider   dexamethasone (DECADRON) 4 MG tablet Take 2 tabs at 11 PM night before blood work. Yes Vanessa Pereira MD   omeprazole (PRILOSEC) 40 MG delayed release capsule TAKE ONE CAPSULE BY MOUTH DAILY Yes Vanessa Pereira MD   spironolactone (ALDACTONE) 25 MG tablet TAKE 1 TABLET DAILY Yes Vanessa Pereira MD   albuterol sulfate HFA (PROAIR HFA) 108 (90 Base) MCG/ACT inhaler INHALE ONE PUFF BY MOUTH EVERY 4 HOURS AS NEEDED FOR WHEEZING Yes Vanessa Pereira MD   loratadine (CLARITIN) 10 MG tablet Take 10 mg by mouth daily Yes Historical Provider, MD   Naproxen Sodium (ALEVE) 220 MG CAPS Take 1 tablet by mouth daily. Yes Vanessa Pereira MD   Diphenhydramine-APAP, sleep, (TYLENOL PM EXTRA STRENGTH PO) Take  by mouth. 1/2 every night at bedtime  Yes Historical Provider, MD   fluticasone (ARNUITY ELLIPTA) 200 MCG/ACT AEPB   Historical Provider, MD      Family History   Problem Relation Age of Onset    Diabetes Mother     High Blood Pressure Mother     Cancer Maternal Grandmother      Social History     Tobacco Use    Smoking status: Never Smoker    Smokeless tobacco: Never Used    Tobacco comment: congratulated on nonsmoking status. Substance Use Topics    Alcohol use:  Yes     Alcohol/week: 14.0 standard drinks     Types: 14 Standard drinks or equivalent per week Comment: social     Drug use: No      LAST LABS  Cholesterol, Total   Date Value Ref Range Status   11/15/2019 252 (H) 0 - 199 mg/dL Final     LDL Calculated   Date Value Ref Range Status   11/15/2019 161 (H) <100 mg/dL Final     HDL   Date Value Ref Range Status   11/15/2019 52 40 - 60 mg/dL Final   02/09/2012 58 40 - 60 mg/dl Final     Triglycerides   Date Value Ref Range Status   11/15/2019 193 (H) 0 - 150 mg/dL Final     Lab Results   Component Value Date    GLUCOSE 106 (H) 11/15/2019     Lab Results   Component Value Date     11/15/2019    K 4.4 11/15/2019    CREATININE 0.7 11/15/2019     Lab Results   Component Value Date    WBC 7.0 02/09/2012    HGB 14.6 02/09/2012    HCT 43.5 02/09/2012    MCV 91.7 02/09/2012     02/09/2012     Lab Results   Component Value Date    ALT 21 11/15/2019    AST 20 11/15/2019    ALKPHOS 90 11/15/2019    BILITOT 0.3 11/15/2019     TSH (uIU/mL)   Date Value   08/01/2017 1.60     Lab Results   Component Value Date    LABA1C 5.3 11/15/2019     Objective:   PHYSICAL EXAM   /84 (Site: Right Upper Arm, Position: Sitting, Cuff Size: Medium Adult)   Pulse 86   Temp 97.9 °F (36.6 °C) (Temporal)   Resp 16   Ht 5' 4\" (1.626 m)   Wt 158 lb (71.7 kg)   SpO2 100%   BMI 27.12 kg/m²   BP Readings from Last 5 Encounters:   12/16/20 130/84   02/11/20 126/86   11/21/19 (!) 132/92   11/15/19 120/80   09/17/18 118/80     Wt Readings from Last 5 Encounters:   12/16/20 158 lb (71.7 kg)   02/11/20 156 lb (70.8 kg)   11/21/19 153 lb (69.4 kg)   11/15/19 153 lb (69.4 kg)   09/17/18 153 lb (69.4 kg)      GENERAL:   · well-developed, well-nourished, alert, no distress. EYES:   · External findings: lids and lashes normal and conjunctivae and sclerae normal  · Eyes: no periorbital cellulitis.   ENT:   · External nose and ears appear normal  · normal TM's and external ear canals both ears  · Pharynx: normal. Exudates: None  · Lips, mucosa, and tongue normal · Hearing grossly normal.     NECK:   · Supple, symmetrical, trachea midline  · Thyroid not enlarged, symmetric, no tenderness/mass/nodules  LYMPH:  · no cervical nodes, no supraclavicular nodes  LUNGS:    · Breathing unlabored  · clear to auscultation bilaterally and good air movement  CARDIOVASC:   · regular rate and rhythm, S1, S2 normal. No murmur, click, rub or gallop  · Apical impulse normal  · LEGS:  Lower extremity edema: none    · No carotid bruits  ABDOMEN:   · Soft, non-tender, no masses  · No hepatosplenomegaly  · No hernias noted. Exam limited by N/A  SKIN: warm and dry  · No rashes or suspicious lesions  · No nodules or induration  PSYCH:    · Alert and oriented  · Normal reasoning, insight good  · Facial expressions full, mood appropriate  · No memory disturbance noted  MUSCULOSKEL:    · Gait normal, assistive device: none  · No significant finger or nail findings  · Spine symmetric, no deformities, no kyphosis      Assessment and Plan:      Diagnosis Orders   1. Annual physical exam  COMPREHENSIVE METABOLIC PANEL   2. Hypertriglyceridemia  LIPID PANEL   3. Hyperglycemia  Hemoglobin A1C   4. Moderate persistent asthma without complication     5. Need for zoster vaccination  Zoster Subunit (SHINGRIX)    Zoster Subunit ([address])   6. Low back pain radiating to right leg  XR LUMBAR SPINE (MIN 4 VIEWS)    Ambulatory referral to Physical Therapy   7. Weight gain  dexamethasone (DECADRON) 4 MG tablet    CORTISOL AM   Stable. Plan as above and below. INSTRUCTIONS  · NEXT APPOINTMENT: Please schedule check-up in 6 months. · PLEASE TAKE THIS FORM TO CHECK-OUT WINDOW TO SCHEDULE NEXT VISIT. Take dexamethasone at 11 PM. Get fasting blood work at 8 AM next day. PLEASE GET FASTING BLOODWORK DRAWN SOON. Lab is on first floor in [address]. Hours Monday to Friday 7 AM to 5 PM.   · Get x-ray. [facility] to get the x-ray. No appointment needed. · Please get flu vaccine when available in fall. Can get either at this office or at stores such as [employer] and [employer]. · Ask GYN to fax PAP result to Dr. Margi Henderson at [phone]. · Schedule lab visit in 2-3 months for shingrix #2. · Eat less, move more! You can do it! · Go to PT  · May take 2 aleve every 12 hours. May also take acetaminophen (Tylenol) as instructed on packaging.

## 2020-12-21 RX ORDER — ALBUTEROL SULFATE 90 UG/1
AEROSOL, METERED RESPIRATORY (INHALATION)
Qty: 1 INHALER | Refills: 2 | Status: SHIPPED | OUTPATIENT
Start: 2020-12-21

## 2020-12-21 RX ORDER — OMEPRAZOLE 40 MG/1
CAPSULE, DELAYED RELEASE ORAL
Qty: 90 CAPSULE | Refills: 1 | Status: SHIPPED | OUTPATIENT
Start: 2020-12-21 | End: 2021-06-23 | Stop reason: SDUPTHER

## 2020-12-21 RX ORDER — FLUTICASONE FUROATE 200 UG/1
POWDER RESPIRATORY (INHALATION)
Qty: 30 EACH | Status: CANCELLED | OUTPATIENT
Start: 2020-12-21

## 2020-12-21 RX ORDER — SPIRONOLACTONE 25 MG/1
TABLET ORAL
Qty: 90 TABLET | Refills: 1 | Status: SHIPPED | OUTPATIENT
Start: 2020-12-21 | End: 2020-12-28

## 2020-12-21 RX ORDER — FLUTICASONE FUROATE 200 UG/1
1 POWDER RESPIRATORY (INHALATION) DAILY
Qty: 1 EACH | Refills: 5 | Status: SHIPPED | OUTPATIENT
Start: 2020-12-21 | End: 2021-01-20

## 2020-12-28 RX ORDER — SPIRONOLACTONE 25 MG/1
TABLET ORAL
Qty: 90 TABLET | Refills: 0 | Status: SHIPPED | OUTPATIENT
Start: 2020-12-28 | End: 2021-01-14 | Stop reason: SDUPTHER

## 2021-01-05 ENCOUNTER — PATIENT MESSAGE (OUTPATIENT)
Dept: FAMILY MEDICINE CLINIC | Age: 56
End: 2021-01-05

## 2021-01-05 DIAGNOSIS — R63.5 WEIGHT GAIN: ICD-10-CM

## 2021-01-05 NOTE — TELEPHONE ENCOUNTER
From: Heavenly Zuñiga  To: Kendall Mendez MD  Sent: 1/5/2021 11:38 AM EST  Subject: Prescription Question    Hi Dr. Yamilet Dennis. I hope the [facility] transfer went according to plan! And that your family had a good and restful holiday. I accidentally took one of the Dex. .. tablets instead of my Spiranolactone. Ana. So I guess I need a new prescription of the Dex. .. tablets so that I can take 2 before my Cortisol blood test the next morning. I still have one of the Dex pills so I only need one more. Can you call that in to [facility]?     Thanks and have a good day!

## 2021-01-06 RX ORDER — DEXAMETHASONE 4 MG/1
TABLET ORAL
Qty: 2 TABLET | Refills: 0 | Status: SHIPPED | OUTPATIENT
Start: 2021-01-06 | End: 2021-06-23

## 2021-01-13 DIAGNOSIS — Z00.00 ANNUAL PHYSICAL EXAM: ICD-10-CM

## 2021-01-13 DIAGNOSIS — R63.5 WEIGHT GAIN: ICD-10-CM

## 2021-01-13 DIAGNOSIS — R73.9 HYPERGLYCEMIA: ICD-10-CM

## 2021-01-13 DIAGNOSIS — E78.1 HYPERTRIGLYCERIDEMIA: ICD-10-CM

## 2021-01-13 LAB
A/G RATIO: 1.6 (ref 1.1–2.2)
ALBUMIN SERPL-MCNC: 4.5 G/DL (ref 3.4–5)
ALP BLD-CCNC: 100 U/L (ref 40–129)
ALT SERPL-CCNC: 20 U/L (ref 10–40)
ANION GAP SERPL CALCULATED.3IONS-SCNC: 13 MMOL/L (ref 3–16)
AST SERPL-CCNC: 18 U/L (ref 15–37)
BILIRUB SERPL-MCNC: <0.2 MG/DL (ref 0–1)
BUN BLDV-MCNC: 22 MG/DL (ref 7–20)
CALCIUM SERPL-MCNC: 10 MG/DL (ref 8.3–10.6)
CHLORIDE BLD-SCNC: 100 MMOL/L (ref 99–110)
CHOLESTEROL, TOTAL: 264 MG/DL (ref 0–199)
CO2: 22 MMOL/L (ref 21–32)
CORTISOL - AM: 0.9 UG/DL (ref 4.3–22.4)
CREAT SERPL-MCNC: 0.7 MG/DL (ref 0.6–1.1)
GFR AFRICAN AMERICAN: >60
GFR NON-AFRICAN AMERICAN: >60
GLOBULIN: 2.8 G/DL
GLUCOSE BLD-MCNC: 170 MG/DL (ref 70–99)
HDLC SERPL-MCNC: 63 MG/DL (ref 40–60)
LDL CHOLESTEROL CALCULATED: 183 MG/DL
POTASSIUM SERPL-SCNC: 4.7 MMOL/L (ref 3.5–5.1)
SODIUM BLD-SCNC: 135 MMOL/L (ref 136–145)
TOTAL PROTEIN: 7.3 G/DL (ref 6.4–8.2)
TRIGL SERPL-MCNC: 90 MG/DL (ref 0–150)
VLDLC SERPL CALC-MCNC: 18 MG/DL

## 2021-01-14 ENCOUNTER — PATIENT MESSAGE (OUTPATIENT)
Dept: FAMILY MEDICINE CLINIC | Age: 56
End: 2021-01-14

## 2021-01-14 ENCOUNTER — TELEPHONE (OUTPATIENT)
Dept: FAMILY MEDICINE CLINIC | Age: 56
End: 2021-01-14

## 2021-01-14 DIAGNOSIS — M54.50 LOW BACK PAIN RADIATING TO RIGHT LEG: Primary | ICD-10-CM

## 2021-01-14 DIAGNOSIS — L71.9 ROSACEA: ICD-10-CM

## 2021-01-14 DIAGNOSIS — M79.604 LOW BACK PAIN RADIATING TO RIGHT LEG: Primary | ICD-10-CM

## 2021-01-14 PROBLEM — E78.5 HYPERLIPIDEMIA LDL GOAL <160: Status: ACTIVE | Noted: 2021-01-14

## 2021-01-14 LAB
ESTIMATED AVERAGE GLUCOSE: 111.2 MG/DL
HBA1C MFR BLD: 5.5 %

## 2021-01-14 RX ORDER — SPIRONOLACTONE 25 MG/1
TABLET ORAL
Qty: 90 TABLET | Refills: 0 | Status: SHIPPED | OUTPATIENT
Start: 2021-01-14 | End: 2021-05-04 | Stop reason: SDUPTHER

## 2021-01-14 NOTE — TELEPHONE ENCOUNTER
From: Nomi Tellez  To: Ileana Parks MD  Sent: 1/14/2021 2:06 PM EST  Subject: Non-Urgent Medical Question    Hi Dr. Samm Oviedo. I'm following on our discussion at my annual appointment regarding possible physical therapy for my continuing right hip / lower back pain. It does keep me awake at night on a regular basis so I'm interested in trying PT in the upcoming months. (I will have to wait for a coworker to finish her PT first!) I didn't see any documentation wherein you prescribed any PT for me. Do I just contact the [facility] physical therapy department to arrange PT with them? Do I need any documentation from you? Thanks for your help.

## 2021-02-03 ENCOUNTER — HOSPITAL ENCOUNTER (OUTPATIENT)
Dept: PHYSICAL THERAPY | Age: 56
Setting detail: THERAPIES SERIES
Discharge: HOME | End: 2021-02-03
Payer: COMMERCIAL

## 2021-02-03 PROCEDURE — 97162 PT EVAL MOD COMPLEX 30 MIN: CPT

## 2021-02-03 PROCEDURE — 97110 THERAPEUTIC EXERCISES: CPT

## 2021-02-03 ASSESSMENT — PAIN SCALES - QUEBEC BACK PAIN DISABILITY SCALE
LIFT AND CARRY A HEAVY SUITCASE: 0
WALK SEVERAL KILOMETERS  OR MILES: 0
TOTAL SCORE: 22
GET OUT OF BED: 0
THROW A BALL: 0
TURN OVER IN BED: 4
QUEBEC CMS MODIFIER: CJ
WALK A FEW BLOCKS OR 300 TO 400M: 0
MOVE A CHAIR: 0
RUN ONE BLOCK OR 100M: 3

## 2021-02-03 NOTE — PROGRESS NOTES
[facility]. [address]  Phone: [phone]   Fax:     [phone]                                                       Physical Therapy Certification    Dear Referring Practitioner: Dr Manuelito Mckeon    We had the pleasure of evaluating the following patient for physical therapy services at [facility]. A summary of our findings can be found in the initial assessment below. This includes our plan of care. If you have any questions or concerns regarding these findings, please do not hesitate to contact me at the office phone number checked above.   Thank you for the referral.       Physician Signature:_______________________________Date:__________________  By signing above (or electronic signature), therapists plan is approved by physician                  Patient: Nasir Lynn   : 1965   MRN: [id number]  Referring Physician: Referring Practitioner: Dr Manuelito Mckeon      Evaluation Date: 2/3/2021      Medical Diagnosis Information:  Diagnosis: Low back pain radiating to right leg (M54.5   Treatment Diagnosis: Lumbar hypomobility with myofascial stiffness                                         Insurance information: [employer]      Precautions/ Contra-indications:   Latex Allergy:  [x]NO      []YES  Preferred Language for Healthcare:   [x]English       []other:    C-SSRS Triggered by Intake questionnaire (Past 2 wk assessment ):   [x] No, Questionnaire did not trigger screening.   [] Yes, Patient intake triggered C-SSRS Screening      [] C-SSRS Screening completed  [] PCP notified via Epic     SUBJECTIVE: C/O LBP right buttocks/ hip pain with occasional radiation to big toe      Relevant Medical History:   Functional Outcome Measure: [place] = 22     Pain Scale: 0-5/10  Easing factors: change of position, better on the move Provocative factors: lying down(bed) and sitting too long, feels stiff with bending to dress     Type: []Constant   [x]Intermittent  []Radiating []Localized []other:     Numbness/Tingling: occasional paresthesia RLE to big toe    Occupation/School: [employer] 30 hours weekly     Living Status/Prior Level of Function: Independent with ADLs and IADLs    OBJECTIVE:   Posture: Right pelvis obliquely higher vs left     Functional Mobility/Transfers: independent     Palpation: ttp lower R LS     Gait:  wfl's    Bandages/Dressings/Incisions: NA    Repeated Movements:    ROM  Comments   Lumbar Flex wfl's Hypomobile lower LS   Lumbar Ext wfl's Hypomobile lower LS     ROM LEFT RIGHT Comments   Lumbar Side Bend wfl's wfl's increased  Right LS    Lumbar Rotation wfl's wfl's    Quadrant      Hip Flexion      Hip Abd      Hip ER      Hip IR      Hip Extension      Knee Ext      Knee Flex      Hamstring Flex      Piriformis      Olaf test                Myotomes/Strength Normal Abnormal Comments   [x]ALL NORMAL      Hip Abd      Hip Ext      Hip flexion (L1-L2 femoral) [x] []    Knee extension (L2-L4 femoral) [x] []    Knee flexion (S1 sciatic) x     Dorsiflexion (L4-L5 deep peroneal) [x] []    Great Toe Ext (L5 deep peroneal nerve) [x] []    Ankle Eversion (S1-S2 super peroneal) [x] []    Ankle PF(S1-S2 tibial) [x] []    Multifidus [] []    Transverse Ab [] []      Dermatomes Normal Abnormal Comments   [x]ALL NORMAL            inguinal area (L1)  [] []    anterior mid-thigh (L2) [x] []    distal ant thigh/med knee (L3) [x] []    medial lower leg and foot (L4) [x] []    lateral lower leg and foot (L5) [x] []    posterior calf (S1) [x] []    medial calcaneus (S2) [x] []      Reflexes Normal Abnormal Comments   [x]ALL NORMAL            S1-2 Seated achilles [x] []    S1-2 Prone knee bend [] []    L3-4 Patellar tendon [x] []    C5-6 Biceps [] []    C6 Brachioradialis [] []    C7-8 Triceps [] []    Clonus [] []    Babinski [] [] Tejada's [] []      Joint mobility: lower lumbar   []Normal    [x]Hypo   []Hyper    Neurodynamics:     Orthopedic Special Tests:   Neural dynamic tension testing Normal Abnormal Comments   Slump Test  - Degree of knee flexion:  [x] []    SLR  [] []    0-30 [] []    30-70 [x] []    Femoral nerve (L2-4) [] []       Normal Abnormal N/A Comments   Fwd Bend-aberrant or innominate mvmt) [] [] []    Trendelenburg [] [] []    Kemps/Quadrant [] [x] [] R LS pain    Stork [] [] []    NASIMA/Noam [x] [] []    Hip scour [x] [] []    Supine to sit [] [] []    Prone knee bend [] [] []           Hip thrust [] [] []    SI distraction/compression [] [] []    Sacral Spring/thrust [] [] []               [x] Patient history, allergies, meds reviewed. Medical chart reviewed. See intake form. Review Of Systems (ROS):  [x]Performed Review of systems (Integumentary, CardioPulmonary, Neurological) by intake and observation. Intake form has been scanned into medical record. Patient has been instructed to contact their primary care physician regarding ROS issues if not already being addressed at this time.       Co-morbidities/Complexities (which will affect course of rehabilitation):   []None           Arthritic conditions   []Rheumatoid arthritis (M05.9)  []Osteoarthritis (M19.91)   Cardiovascular conditions   [x]Hypertension (I10)  []Hyperlipidemia (E78.5)  []Angina pectoris (I20)  []Atherosclerosis (I70)  []CVA Musculoskeletal conditions   []Disc pathology   []Congenital spine pathologies   []Prior surgical intervention  []Osteoporosis (M81.8)  []Osteopenia (M85.8)   Endocrine conditions   []Hypothyroid (E03.9)  []Hyperthyroid Gastrointestinal conditions   []Constipation (T02.32)   Metabolic conditions   []Morbid obesity (E66.01)  []Diabetes type 1(E10.65) or 2 (E11.65)   []Neuropathy (G60.9)     Pulmonary conditions   [x]Asthma (J45)  []Coughing   []COPD (J44.9)   Psychological Disorders  []Anxiety (F41.9)  []Depression (F32.9) []Other:   [x]Other:     Chronic back pain 20 years plus       Barriers to/and or personal factors that will affect rehab potential:              []Age  []Sex    []Smoker              []Motivation/Lack of Motivation                        [x]Co-Morbidities              []Cognitive Function, education/learning barriers              []Environmental, home barriers              []profession/work barriers  []past PT/medical experience  []other:  Justification:     Falls Risk Assessment (30 days):   [x] Falls Risk assessed and no intervention required.   [] Falls Risk assessed and Patient requires intervention due to being higher risk   TUG score (>12s at risk):     [] Falls education provided, including:         ASSESSMENT:   Functional Impairments:     [x]Noted lumbar/proximal hip hypomobility   []Noted lumbosacral and/or generalized hypermobility   [x]Decreased Lumbosacral/hip/LE functional ROM   [x]Decreased core/proximal hip strength and neuromuscular control    []Decreased LE functional strength    []Abnormal reflexes/sensation/myotomal/dermatomal deficits  []Reduced balance/proprioceptive control    []other:      Functional Activity Limitations (from functional questionnaire and intake)   []Reduced ability to tolerate prolonged functional positions   [x]Reduced ability or difficulty with changes of positions or transfers between positions   [x]Reduced ability to maintain good posture and demonstrate good body mechanics with sitting, bending, and lifting   [x]Reduced ability to sleep   [] Reduced ability or tolerance with driving and/or computer work   [x]Reduced ability to perform lifting, reaching, carrying tasks   []Reduced ability to squat   []Reduced ability to forward bend   []Reduced ability to ambulate prolonged functional periods/distances/surfaces   []Reduced ability to ascend/descend stairs   []other:       Participation Restrictions   []Reduced participation in self care activities [x]Reduced participation in home management activities   [x]Reduced participation in work activities   []Reduced participation in social activities. []Reduced participation in sport/recreational activities. Classification:   []Signs/symptoms consistent with Lumbar instability/stabilization subgroup. [x]Signs/symptoms consistent with Lumbar mobilization/manipulation subgroup, myotomes and dermatomes intact. Meets manipulation criteria. []Signs/symptoms consistent with Lumbar direction specific/centralization subgroup   []Signs/symptoms consistent with Lumbar traction subgroup       [x]Signs/symptoms consistent with lumbar facet dysfunction   []Signs/symptoms consistent with lumbar stenosis type dysfunction   []Signs/symptoms consistent with nerve root involvement including myotome & dermatome dysfunction   []Signs/symptoms consistent with post-surgical status including: decreased ROM, strength and function.    []signs/symptoms consistent with pathology which may benefit from Dry needling     []other:      Prognosis/Rehab Potential:      []Excellent   [x]Good    []Fair   []Poor    Tolerance of evaluation/treatment:    []Excellent   [x]Good    []Fair   []Poor     Physical Therapy Evaluation Complexity Justification  [x] A history of present problem with:  [] no personal factors and/or comorbidities that impact the plan of care;  []1-2 personal factors and/or comorbidities that impact the plan of care  [x]3 personal factors and/or comorbidities that impact the plan of care  [x] An examination of body systems using standardized tests and measures addressing any of the following: body structures and functions (impairments), activity limitations, and/or participation restrictions;:  [] a total of 1-2 or more elements   [x] a total of 3 or more elements   [] a total of 4 or more elements   [x] A clinical presentation with:  [] stable and/or uncomplicated characteristics [] evolving clinical presentation with changing characteristics  [] unstable and unpredictable characteristics;   [x] Clinical decision making of [] low, [] moderate, [] high complexity using standardized patient assessment instrument and/or measurable assessment of functional outcome. [] EVAL (LOW) 97518 (typically 20 minutes face-to-face)  [x] EVAL (MOD) 92880 (typically 30 minutes face-to-face)  [] EVAL (HIGH) 34895 (typically 45 minutes face-to-face)  [] RE-EVAL     PLAN: Begin PT focusing on: proximal hip mobilizations, LB mobs, LB core activation, proximal hip activation, and HEP    Frequency/Duration:  2-3 days per week for 4-6 Weeks:  Interventions:  [x]  Therapeutic exercise including: strength training, ROM, for LE, Glutes and core   [x]  NMR activation and proprioception for glutes , LE and Core   [x]  Manual therapy as indicated for Hip complex, LE and spine to include: Dry Needling/IASTM, STM, PROM, Gr I-IV mobilizations, manipulation. [x]  Modalities as needed that may include: thermal agents, E-stim, Biofeedback, US, iontophoresis as indicated  [x]  Patient education on joint protection, postural re-education, activity modification, progression of HEP. HEP instruction: Access Code: [email or web address]   URL: [email or web address]   Date: 02/03/2021   Prepared by: Idella Primrose     Exercises   Hooklying Lumbar Rotation - 30 reps - 1-2 sets - 2x daily - 7x weekly   Supine Single Knee to Chest Stretch - 2-4 reps - 1 sets - 30 hold - 2x daily - 7x weekly   Supine Posterior Pelvic Tilt - 15 reps - 1 sets - 5 hold - 2x daily - 7x weekly   Supine Piriformis Stretch with Foot on Ground - 2-4 reps - 1 sets - 30 hold - 2x daily - 7x weekly   The patient demonstrated good tolerance to and understanding of the HEP. Written instructions have been issued. GOALS:  Patient stated goal: \"learn what the problem is and exercises that help will help it'.   [] Progressing: [] Met: [] Not Met: [] Adjusted Therapist goals for Patient:   Short Term Goals: To be achieved in: 2 weeks  1. Independent in HEP and progression per patient tolerance, in order to prevent re-injury. [] Progressing: [] Met: [] Not Met: [] Adjusted  2. Patient will have a decrease in pain 0-3/10 to facilitate improvement in movement, function, and ADLs as indicated by Functional Deficits. [] Progressing: [] Met: [] Not Met: [] Adjusted    Long Term Goals: To be achieved in: 4-6 weeks  1. Disability index score of 11 or less for the [place] to assist with reaching prior level of function. [] Progressing: [] Met: [] Not Met: [] Adjusted  2. Patient will demonstrate increased AROM to WNL, good LS mobility, good hip ROM to allow for proper joint functioning as indicated by patients Functional Deficits. [] Progressing: [] Met: [] Not Met: [] Adjusted  3. Patient will have a decrease in pain 0-2/10 to facilitate improvement in movement, function, and ADLs as indicated by Functional Deficits. [] Progressing: [] Met: [] Not Met: [] Adjusted    Electronically signed by:  Bradford Smallwood PT        Note: If patient does not return for scheduled/recommended follow up visits, this note will serve as a discharge from care along with the most recent update on progress.

## 2021-02-03 NOTE — FLOWSHEET NOTE
Other Therapeutic Activities:  Pt was educated on PT POC, Diagnosis, Prognosis, pathomechanics as well as frequency and duration of scheduling future physical therapy appointments. Time was also taken on this day to answer all patient questions and participation in PT. Reviewed appointment policy in detail with patient and patient verbalized understanding. Home Exercise Program:     Therapeutic Exercise and NMR EXR  [] (40972) Provided verbal/tactile cueing for activities related to strengthening, flexibility, endurance, ROM  for improvements in proximal hip and core control with self care, mobility, lifting and ambulation.  [] (42845) Provided verbal/tactile cueing for activities related to improving balance, coordination, kinesthetic sense, posture, motor skill, proprioception  to assist with core control in self care, mobility, lifting, and ambulation.      Therapeutic Activities:    [] (45570 or 63113) Provided verbal/tactile cueing for activities related to improving balance, coordination, kinesthetic sense, posture, motor skill, proprioception and motor activation to allow for proper function  with self care and ADLs  [] (38942) Provided training and instruction to the patient for proper core and proximal hip recruitment and positioning with ambulation re-education     Home Exercise Program:    [] (91774) Reviewed/Progressed HEP activities related to strengthening, flexibility, endurance, ROM of core, proximal hip and LE for functional self-care, mobility, lifting and ambulation   [] (43305) Reviewed/Progressed HEP activities related to improving balance, coordination, kinesthetic sense, posture, motor skill, proprioception of core, proximal hip and LE for self care, mobility, lifting, and ambulation      Manual Treatments:  PROM / STM / Oscillations-Mobs:  G-I, II, III, IV (PA's, Inf., Post.) [] (01600) Provided manual therapy to mobilize proximal hip and LS spine soft tissue/joints for the purpose of modulating pain, promoting relaxation,  increasing ROM, reducing/eliminating soft tissue swelling/inflammation/restriction, improving soft tissue extensibility and allowing for proper ROM for normal function with self care, mobility, lifting and ambulation. Charges:  Timed Code Treatment Minutes: 15   Total Treatment Minutes: 45     [] EVAL (LOW) 11912 (typically 20 minutes face-to-face)  [x] EVAL (MOD) 58839 (typically 30 minutes face-to-face)  [] EVAL (HIGH) 44093 (typically 45 minutes face-to-face)  [] RE-EVAL     [x] KW(17812) x     [] Dry needle 1 or 2 Muscles (63430)  [] NMR (35344) x     [] Dry needle 3+ Muscles (69514)  [] Manual (49596) x     [] Ultrasound (42423) x  [] TA (66685) x     [] Mech Traction (23908)  [] ES(attended) (51204)     [] ES (un) (65079):   [] Vasopump (08033) [] Ionto (47432)   [] Other:    GOALS:  HEP instruction: Access Code: [email or web address]   URL: [email or web address]   Date: 02/03/2021   Prepared by: Kb Booty     Exercises   · Hooklying Lumbar Rotation - 30 reps - 1-2 sets - 2x daily - 7x weekly   · Supine Single Knee to Chest Stretch - 2-4 reps - 1 sets - 30 hold - 2x daily - 7x weekly   · Supine Posterior Pelvic Tilt - 15 reps - 1 sets - 5 hold - 2x daily - 7x weekly   · Supine Piriformis Stretch with Foot on Ground - 2-4 reps - 1 sets - 30 hold - 2x daily - 7x weekly   The patient demonstrated good tolerance to and understanding of the HEP. Written instructions have been issued. ASSESSMENT:  See eval    Treatment/Activity Tolerance:  [x] Patient tolerated treatment well [] Patient limited by fatique  [] Patient limited by pain  [] Patient limited by other medical complications  [] Other:     Overall Progression Towards Functional goals/ Treatment Progress Update:  [] Patient is progressing as expected towards functional goals listed. [] Progression is slowed due to complexities/Impairments listed. [] Progression has been slowed due to co-morbidities. [x] Plan just implemented, too soon to assess goals progression <30days   [] Goals require adjustment due to lack of progress  [] Patient is not progressing as expected and requires additional follow up with physician  [] Other:    Prognosis for POC: [x] Good [] Fair  [] Poor    Patient requires continued skilled intervention: [x] Yes  [] No        PLAN: Man Rx to Pelvis and lumbar spine (Mobs, MFR and MET), Review HEP, teach NS   [] Continue per plan of care [] Alter current plan (see comments)  [x] Plan of care initiated [] Hold pending MD visit [] Discharge    Electronically signed by: Mitzy Mobley PT    Note: If patient does not return for scheduled/recommended follow up visits, this note will serve as a discharge from care along with the most recent update on progress.

## 2021-02-10 ENCOUNTER — APPOINTMENT (OUTPATIENT)
Dept: PHYSICAL THERAPY | Age: 56
End: 2021-02-10
Payer: COMMERCIAL

## 2021-02-10 ENCOUNTER — HOSPITAL ENCOUNTER (OUTPATIENT)
Dept: PHYSICAL THERAPY | Age: 56
Setting detail: THERAPIES SERIES
Discharge: HOME | End: 2021-02-10
Payer: COMMERCIAL

## 2021-02-10 PROCEDURE — 97140 MANUAL THERAPY 1/> REGIONS: CPT

## 2021-02-10 PROCEDURE — 97110 THERAPEUTIC EXERCISES: CPT

## 2021-02-10 NOTE — FLOWSHEET NOTE
[address] [facility] [address]  Phone: [phone]   Fax:     [phone]    Physical Therapy Treatment Note/ Progress Report:     Date:  2/10/2021    Patient Name:  Chantell Collins    :  1965  MRN: [id number]    Pertinent Medical History:      Medical/Treatment Diagnosis Information:  · Diagnosis: Low back pain radiating to right leg (M54.5   :  Treatment Diagnosis: Lumbar hypomobility with myofascial stiffness      Insurance/Certification information:     Physician Information:     Plan of care signed (Y/N):     Date of Patient follow up with Physician:      Progress Report: []  Yes  [x]  No     Date Range for reporting period:  Beginnin/10/2021  Ending:    Progress report due (10 Rx/or 30 days whichever is less):     Recertification due (POC duration/ or 90 days whichever is less):    Visit # POC/ Insurance Allowable Auth Needed   2 BOMN []Yes    [x]No     Functional Scale:       Date Assessed: at eval  Test: [place]= 22  Score:     Pain level:  0-5/10     History of Injury:    SUBJECTIVE:    2/10/21 Pt reports maybe a little less pain, was able to shovel snow without too much pain. OBJECTIVE:    Observation:    Test measurements:      RESTRICTIONS/PRECAUTIONS:     Exercises/Interventions:     Therapeutic Ex (55202)   Min: Resistance/Reps Notes/Cues   T slide in NS  Rows  bilat ext     PPT standing           Mat ex     PPT  March  Alt shld.  flex 5\" x 10  20 x   2# alt 20 x     Bridge 5\" 15 x     SL'ing hip abd      Hip stretches   ITB, Rectus fem                         Manual Intervention (46360) Min: 35 min    Stretch  To L/R QL/ ITB, piriformis , lumbar flexion via knee to chest , R QL (in SL'ing)    Mobs Lumbar  Flex L5-S1, Lateral flex L5 -S1  And right rotation all grade 3   R hip distraction grade 4     DTM Bilateral dorsal LS pvm's               NMR re-education (51830)   Min:     Mf Activation- re-ed     TrA Re-ed activation     Glute Max re-ed activation          Therapeutic Activity (53235) Min:               Modalities  Min:             Other Therapeutic Activities:  Pt was educated on PT POC, Diagnosis, Prognosis, pathomechanics as well as frequency and duration of scheduling future physical therapy appointments. Time was also taken on this day to answer all patient questions and participation in PT. Reviewed appointment policy in detail with patient and patient verbalized understanding. Home Exercise Program:   HEP instruction: Access Code: [email or web address]   URL: [facility]/   Date: 02/03/2021   Prepared by: Faiza Gutierres     Exercises   · Hooklying Lumbar Rotation - 30 reps - 1-2 sets - 2x daily - 7x weekly   · Supine Single Knee to Chest Stretch - 2-4 reps - 1 sets - 30 hold - 2x daily - 7x weekly   · Supine Posterior Pelvic Tilt - 15 reps - 1 sets - 5 hold - 2x daily - 7x weekly   · Supine Piriformis Stretch with Foot on Ground - 2-4 reps - 1 sets - 30 hold - 2x daily - 7x weekly   The patient demonstrated good tolerance to and understanding of the HEP. Written instructions have been issued. 2/10/21 Reviewed HEP, good tolerance and tech. Therapeutic Exercise and NMR EXR  [] (61357) Provided verbal/tactile cueing for activities related to strengthening, flexibility, endurance, ROM  for improvements in proximal hip and core control with self care, mobility, lifting and ambulation.  [] (26974) Provided verbal/tactile cueing for activities related to improving balance, coordination, kinesthetic sense, posture, motor skill, proprioception  to assist with core control in self care, mobility, lifting, and ambulation.      Therapeutic Activities:    [] (89413 or 76243) Provided verbal/tactile cueing for activities related to improving balance, coordination, kinesthetic sense, posture, motor skill, proprioception and motor activation to allow for proper function  with self care and ADLs  [] (16653) Provided training and instruction to the patient for proper core and proximal hip recruitment and positioning with ambulation re-education     Home Exercise Program:    [] (59223) Reviewed/Progressed HEP activities related to strengthening, flexibility, endurance, ROM of core, proximal hip and LE for functional self-care, mobility, lifting and ambulation   [] (13510) Reviewed/Progressed HEP activities related to improving balance, coordination, kinesthetic sense, posture, motor skill, proprioception of core, proximal hip and LE for self care, mobility, lifting, and ambulation      Manual Treatments:  PROM / STM / Oscillations-Mobs:  G-I, II, III, IV (PA's, Inf., Post.)  [] (44903) Provided manual therapy to mobilize proximal hip and LS spine soft tissue/joints for the purpose of modulating pain, promoting relaxation,  increasing ROM, reducing/eliminating soft tissue swelling/inflammation/restriction, improving soft tissue extensibility and allowing for proper ROM for normal function with self care, mobility, lifting and ambulation. Charges:  Timed Code Treatment Minutes: 70   Total Treatment Minutes: 75     [] EVAL (LOW) 26460 (typically 20 minutes face-to-face)  [] EVAL (MOD) 46765 (typically 30 minutes face-to-face)  [] EVAL (HIGH) 77296 (typically 45 minutes face-to-face)  [] RE-EVAL     [x] DB(48097) x   2  [] Dry needle 1 or 2 Muscles (97746)  [] NMR (22297) x     [] Dry needle 3+ Muscles (19663)  [x] Manual (59327) x    2 [] Ultrasound (66705) x  [] TA (87286) x     [] [facility] (485[facility]  [] ES(attended) (85656)     [] ES (un) (53563):   [] Vasopump (19315) [] Ionto (08029)   [] Other:    GOALS:  HEP instruction: Access Code: [email or web address]   URL: [email or web address]   Date: 02/03/2021   Prepared by: Vipul George     Exercises   · Hooklying Lumbar Rotation - 30 reps - 1-2 sets - 2x daily - 7x weekly   · Supine Single Knee to Chest Stretch - 2-4 reps - 1 sets - 30 hold - 2x daily - 7x weekly   · Supine Posterior Pelvic Tilt - 15 reps - 1 sets - 5 hold - 2x daily - 7x weekly   · Supine Piriformis Stretch with Foot on Ground - 2-4 reps - 1 sets - 30 hold - 2x daily - 7x weekly   The patient demonstrated good tolerance to and understanding of the HEP. Written instructions have been issued. ASSESSMENT:  See eval    Treatment/Activity Tolerance:  [x] Patient tolerated treatment well [] Patient limited by fatique  [] Patient limited by pain  [] Patient limited by other medical complications  [] Other:     Overall Progression Towards Functional goals/ Treatment Progress Update:  [] Patient is progressing as expected towards functional goals listed. [] Progression is slowed due to complexities/Impairments listed. [] Progression has been slowed due to co-morbidities. [x] Plan just implemented, too soon to assess goals progression <30days   [] Goals require adjustment due to lack of progress  [] Patient is not progressing as expected and requires additional follow up with physician  [] Other:    Prognosis for POC: [x] Good [] Fair  [] Poor    Patient requires continued skilled intervention: [x] Yes  [] No        PLAN: Man Rx to Pelvis and lumbar spine (Mobs, MFR and MET), Review HEP, teach NS   [] Continue per plan of care [] Alter current plan (see comments)  [x] Plan of care initiated [] Hold pending MD visit [] Discharge    Electronically signed by: Julienne Alexander PT    Note: If patient does not return for scheduled/recommended follow up visits, this note will serve as a discharge from care along with the most recent update on progress.

## 2021-02-12 ENCOUNTER — APPOINTMENT (OUTPATIENT)
Dept: PHYSICAL THERAPY | Age: 56
End: 2021-02-12
Payer: COMMERCIAL

## 2021-02-17 ENCOUNTER — APPOINTMENT (OUTPATIENT)
Dept: PHYSICAL THERAPY | Age: 56
End: 2021-02-17
Payer: COMMERCIAL

## 2021-02-17 ENCOUNTER — HOSPITAL ENCOUNTER (OUTPATIENT)
Dept: PHYSICAL THERAPY | Age: 56
Setting detail: THERAPIES SERIES
Discharge: HOME | End: 2021-02-17
Payer: COMMERCIAL

## 2021-02-17 NOTE — FLOWSHEET NOTE
Physical Therapy  Cancellation/No-show Note  Patient Name:  Roxie Tran  :  1965   Date:  2021  Cancelled visits to date: 1  No-shows to date: 0    For today's appointment patient:  [x]  Cancelled  []  Rescheduled appointment  []  No-show     Reason given by patient:  []  Patient ill  []  Conflicting appointment  []  No transportation    []  Conflict with work  [x]  No reason given  []  Other:     Comments:      Electronically signed by:  Eric Desai PT

## 2021-02-19 ENCOUNTER — HOSPITAL ENCOUNTER (OUTPATIENT)
Dept: PHYSICAL THERAPY | Age: 56
Setting detail: THERAPIES SERIES
End: 2021-02-19
Payer: COMMERCIAL

## 2021-02-19 ENCOUNTER — APPOINTMENT (OUTPATIENT)
Dept: PHYSICAL THERAPY | Age: 56
End: 2021-02-19
Payer: COMMERCIAL

## 2021-02-24 ENCOUNTER — APPOINTMENT (OUTPATIENT)
Dept: PHYSICAL THERAPY | Age: 56
End: 2021-02-24
Payer: COMMERCIAL

## 2021-02-24 ENCOUNTER — HOSPITAL ENCOUNTER (OUTPATIENT)
Dept: PHYSICAL THERAPY | Age: 56
Setting detail: THERAPIES SERIES
Discharge: HOME | End: 2021-02-24
Payer: COMMERCIAL

## 2021-02-24 PROCEDURE — 97110 THERAPEUTIC EXERCISES: CPT

## 2021-02-24 PROCEDURE — 97140 MANUAL THERAPY 1/> REGIONS: CPT

## 2021-02-24 NOTE — FLOWSHEET NOTE
[address] [facility] [address]  Phone: [phone]   Fax:     [phone]    Physical Therapy Treatment Note/ Progress Report:     Date:  2021    Patient Name:  Brenda Whitlock    :  1965  MRN: [id number]    Pertinent Medical History:      Medical/Treatment Diagnosis Information:  · Diagnosis: Low back pain radiating to right leg (M54.5   :  Treatment Diagnosis: Lumbar hypomobility with myofascial stiffness      Insurance/Certification information:     Physician Information:     Plan of care signed (Y/N):     Date of Patient follow up with Physician:      Progress Report: []  Yes  [x]  No     Date Range for reporting period:  Beginnin2021  Ending:    Progress report due (10 Rx/or 30 days whichever is less): 01    Recertification due (POC duration/ or 90 days whichever is less):    Visit # POC/ Insurance Allowable Auth Needed   3 BOMN []Yes    [x]No     Functional Scale:       Date Assessed: at eval  Test: [place]= 22  Score:     Pain level:  0-5/10     History of Injury:    SUBJECTIVE:    2/10/21 Pt reports maybe a little less pain, was able to shovel snow without too much pain. 21 Patient reports back feeling a little better,some soreness in right hip area. OBJECTIVE:   ? Observation:   ? Test measurements:      RESTRICTIONS/PRECAUTIONS:     Exercises/Interventions:     Therapeutic Ex (94867)   Min: Resistance/Reps Notes/Cues   T slide in NS  Rows  bilat ext Blue   15 x  2   Green 15 x 2     PPT standing           Mat ex     PPT  March  Alt shld.  flex 5\" x 10  20 x   2# alt 20 x     Bridge 5\" 15 x     SL'ing hip abd      Hip stretches   ITB, Rectus fem                         Manual Intervention (47345) Min: 30 min    Stretch  To L/R QL/ ITB, piriformis , lumbar flexion via knee to chest , R QL (in SL'ing)    Mobs Lumbar  Flex L5-S1, Lateral flex L5 -S1 [] (92223 or 35759) Provided verbal/tactile cueing for activities related to improving balance, coordination, kinesthetic sense, posture, motor skill, proprioception and motor activation to allow for proper function  with self care and ADLs  [] (79280) Provided training and instruction to the patient for proper core and proximal hip recruitment and positioning with ambulation re-education     Home Exercise Program:    [] (06911) Reviewed/Progressed HEP activities related to strengthening, flexibility, endurance, ROM of core, proximal hip and LE for functional self-care, mobility, lifting and ambulation   [] (09267) Reviewed/Progressed HEP activities related to improving balance, coordination, kinesthetic sense, posture, motor skill, proprioception of core, proximal hip and LE for self care, mobility, lifting, and ambulation      Manual Treatments:  PROM / STM / Oscillations-Mobs:  G-I, II, III, IV (PA's, Inf., Post.)  [] (41465) Provided manual therapy to mobilize proximal hip and LS spine soft tissue/joints for the purpose of modulating pain, promoting relaxation,  increasing ROM, reducing/eliminating soft tissue swelling/inflammation/restriction, improving soft tissue extensibility and allowing for proper ROM for normal function with self care, mobility, lifting and ambulation.      Charges:  Timed Code Treatment Minutes: 50   Total Treatment Minutes: 55     [] EVAL (LOW) 07125 (typically 20 minutes face-to-face)  [] EVAL (MOD) 20401 (typically 30 minutes face-to-face)  [] EVAL (HIGH) 52576 (typically 45 minutes face-to-face)  [] RE-EVAL     [x] WW(64122) x   1  [] Dry needle 1 or 2 Muscles (47653)  [] NMR (84875) x     [] Dry needle 3+ Muscles (62581)  [x] Manual (63241) x    2 [] Ultrasound (98419) x  [] TA (28514) x     [] Mech Traction (24722)  [] ES(attended) (10148)     [] ES (un) (70296):   [] Vasopump (52325) [] Justa Dixon (33828)   [] Other:    GOALS:  HEP instruction: Access Code: [email or web address] URL: [email or web address]   Date: 02/03/2021   Prepared by: Brad Sharp     Exercises   · Hooklying Lumbar Rotation - 30 reps - 1-2 sets - 2x daily - 7x weekly   · Supine Single Knee to Chest Stretch - 2-4 reps - 1 sets - 30 hold - 2x daily - 7x weekly   · Supine Posterior Pelvic Tilt - 15 reps - 1 sets - 5 hold - 2x daily - 7x weekly   · Supine Piriformis Stretch with Foot on Ground - 2-4 reps - 1 sets - 30 hold - 2x daily - 7x weekly   The patient demonstrated good tolerance to and understanding of the HEP. Written instructions have been issued. ASSESSMENT:  See eval    Treatment/Activity Tolerance:  [x] Patient tolerated treatment well [] Patient limited by fatique  [] Patient limited by pain  [] Patient limited by other medical complications  [] Other:     Overall Progression Towards Functional goals/ Treatment Progress Update:  [] Patient is progressing as expected towards functional goals listed. [] Progression is slowed due to complexities/Impairments listed. [] Progression has been slowed due to co-morbidities. [x] Plan just implemented, too soon to assess goals progression <30days   [] Goals require adjustment due to lack of progress  [] Patient is not progressing as expected and requires additional follow up with physician  [] Other:    Prognosis for POC: [x] Good [] Fair  [] Poor    Patient requires continued skilled intervention: [x] Yes  [] No        PLAN: Man Rx to Pelvis and lumbar spine (Mobs, MFR and MET), Review HEP, teach NS   [] Continue per plan of care [] Alter current plan (see comments)  [x] Plan of care initiated [] Hold pending MD visit [] Discharge    Electronically signed by: Brianna Taylor PTA    Note: If patient does not return for scheduled/recommended follow up visits, this note will serve as a discharge from care along with the most recent update on progress.

## 2021-02-26 ENCOUNTER — APPOINTMENT (OUTPATIENT)
Dept: PHYSICAL THERAPY | Age: 56
End: 2021-02-26
Payer: COMMERCIAL

## 2021-03-03 ENCOUNTER — HOSPITAL ENCOUNTER (OUTPATIENT)
Dept: PHYSICAL THERAPY | Age: 56
Setting detail: THERAPIES SERIES
Discharge: HOME | End: 2021-03-03
Payer: COMMERCIAL

## 2021-03-03 ENCOUNTER — APPOINTMENT (OUTPATIENT)
Dept: PHYSICAL THERAPY | Age: 56
End: 2021-03-03
Payer: COMMERCIAL

## 2021-03-03 PROCEDURE — 97110 THERAPEUTIC EXERCISES: CPT

## 2021-03-03 PROCEDURE — 97140 MANUAL THERAPY 1/> REGIONS: CPT

## 2021-03-03 NOTE — FLOWSHEET NOTE
[address]  Phone: [phone]   Fax:     [phone]    Physical Therapy Treatment Note/ Progress Report:     Date:  3/3/2021    Patient Name:  Meliza Luz    :  1965  MRN: [id number]    Pertinent Medical History:      Medical/Treatment Diagnosis Information:  · Diagnosis: Low back pain radiating to right leg (M54.5   :  Treatment Diagnosis: Lumbar hypomobility with myofascial stiffness      Insurance/Certification information:     Physician Information:     Plan of care signed (Y/N):     Date of Patient follow up with Physician:      Progress Report: []  Yes  [x]  No     Date Range for reporting period:  Beginning: 3/3/2021  Ending:    Progress report due (10 Rx/or 30 days whichever is less): 33    Recertification due (POC duration/ or 90 days whichever is less):    Visit # POC/ Insurance Allowable Auth Needed   4 BOMN []Yes    [x]No     Functional Scale:       Date Assessed: at eval  Test: [place]= 22  Score:     Pain level:  0-5/10     History of Injury:    SUBJECTIVE:    2/10/21 Pt reports maybe a little less pain, was able to shovel snow without too much pain. 21 Patient reports back feeling a little better,some soreness in right hip area. 21 Patient reports back feels stiff in am.Overall is feeling looser. OBJECTIVE:   ? Observation:   ? Test measurements:      RESTRICTIONS/PRECAUTIONS:     Exercises/Interventions:     Therapeutic Ex (11337)   Min: Resistance/Reps Notes/Cues   T slide in NS  Rows  bilat ext Blue   15 x  2   Green 15 x 2     PPT standing           Mat ex     PPT  March  Alt shld.  flex 5\" x 10  20 x   2# alt 20 x     Bridge 5\" 15 x     SL'ing hip abd      Hip stretches   ITB, Rectus fem                         Manual Intervention (58766) Min: 30 min Stretch  To L/R QL/ ITB, piriformis , lumbar flexion via knee to chest , R QL (in SL'ing)    Mobs Lumbar  Flex L5-S1, Lateral flex L5 -S1    R hip distraction grade 4     DTM Bilateral dorsal LS pvm's               NMR re-education (44748)   Min:     Mf Activation- re-ed     TrA Re-ed activation     Glute Max re-ed activation          Therapeutic Activity (37343) Min:               Modalities  Min:             Other Therapeutic Activities:  Pt was educated on PT POC, Diagnosis, Prognosis, pathomechanics as well as frequency and duration of scheduling future physical therapy appointments. Time was also taken on this day to answer all patient questions and participation in PT. Reviewed appointment policy in detail with patient and patient verbalized understanding. Home Exercise Program:   HEP instruction: Access Code: [email or web address]   URL: [employer]/   Date: 02/03/2021   Prepared by: Darius Kyara     Exercises   · Hooklying Lumbar Rotation - 30 reps - 1-2 sets - 2x daily - 7x weekly   · Supine Single Knee to Chest Stretch - 2-4 reps - 1 sets - 30 hold - 2x daily - 7x weekly   · Supine Posterior Pelvic Tilt - 15 reps - 1 sets - 5 hold - 2x daily - 7x weekly   · Supine Piriformis Stretch with Foot on Ground - 2-4 reps - 1 sets - 30 hold - 2x daily - 7x weekly   The patient demonstrated good tolerance to and understanding of the HEP. Written instructions have been issued. 2/10/21 Reviewed HEP, good tolerance and clark  Access Code: [email or web address]   URL: [email or web address]   Date: 03/03/2021   Prepared by: Lima Branham     Exercises   Scapular Retraction with Resistance - 10 reps - 3 sets - 3 hold - 1x daily - 7x weekly   Shoulder Extension with Resistance - 10 reps - 3 sets - 3 hold - 1x daily - 7x weekly        Therapeutic Exercise and NMR EXR [] (82983) Provided verbal/tactile cueing for activities related to strengthening, flexibility, endurance, ROM  for improvements in proximal hip and core control with self care, mobility, lifting and ambulation.  [] (62364) Provided verbal/tactile cueing for activities related to improving balance, coordination, kinesthetic sense, posture, motor skill, proprioception  to assist with core control in self care, mobility, lifting, and ambulation. Therapeutic Activities:    [] (52822 or 08030) Provided verbal/tactile cueing for activities related to improving balance, coordination, kinesthetic sense, posture, motor skill, proprioception and motor activation to allow for proper function  with self care and ADLs  [] (55549) Provided training and instruction to the patient for proper core and proximal hip recruitment and positioning with ambulation re-education     Home Exercise Program:    [] (44310) Reviewed/Progressed HEP activities related to strengthening, flexibility, endurance, ROM of core, proximal hip and LE for functional self-care, mobility, lifting and ambulation   [] (20980) Reviewed/Progressed HEP activities related to improving balance, coordination, kinesthetic sense, posture, motor skill, proprioception of core, proximal hip and LE for self care, mobility, lifting, and ambulation      Manual Treatments:  PROM / STM / Oscillations-Mobs:  G-I, II, III, IV (PA's, Inf., Post.)  [] (59371) Provided manual therapy to mobilize proximal hip and LS spine soft tissue/joints for the purpose of modulating pain, promoting relaxation,  increasing ROM, reducing/eliminating soft tissue swelling/inflammation/restriction, improving soft tissue extensibility and allowing for proper ROM for normal function with self care, mobility, lifting and ambulation.      Charges:  Timed Code Treatment Minutes: 50   Total Treatment Minutes: 55     [] EVAL (LOW) 92783 (typically 20 minutes face-to-face) [] EVAL (MOD) 37423 (typically 30 minutes face-to-face)  [] EVAL (HIGH) 52288 (typically 45 minutes face-to-face)  [] RE-EVAL     [x] RR(00150) x   1  [] Dry needle 1 or 2 Muscles (56369)  [] NMR (83981) x     [] Dry needle 3+ Muscles (43399)  [x] Manual (52582) x    2 [] Ultrasound (60879) x  [] TA (73114) x     [] Mech Traction (69783)  [] ES(attended) (90723)     [] ES (un) (08752):   [] Vasopump (40245) [] Ionto (39197)   [] Other:    GOALS:  HEP instruction: Access Code: [email or web address]   URL: [email or web address]   Date: 02/03/2021   Prepared by: Jaci Libman     Exercises   · Hooklying Lumbar Rotation - 30 reps - 1-2 sets - 2x daily - 7x weekly   · Supine Single Knee to Chest Stretch - 2-4 reps - 1 sets - 30 hold - 2x daily - 7x weekly   · Supine Posterior Pelvic Tilt - 15 reps - 1 sets - 5 hold - 2x daily - 7x weekly   · Supine Piriformis Stretch with Foot on Ground - 2-4 reps - 1 sets - 30 hold - 2x daily - 7x weekly   The patient demonstrated good tolerance to and understanding of the HEP. Written instructions have been issued. ASSESSMENT:  See eval    Treatment/Activity Tolerance:  [x] Patient tolerated treatment well [] Patient limited by fatique  [] Patient limited by pain  [] Patient limited by other medical complications  [] Other:     Overall Progression Towards Functional goals/ Treatment Progress Update:  [] Patient is progressing as expected towards functional goals listed. [] Progression is slowed due to complexities/Impairments listed. [] Progression has been slowed due to co-morbidities.   [x] Plan just implemented, too soon to assess goals progression <30days   [] Goals require adjustment due to lack of progress  [] Patient is not progressing as expected and requires additional follow up with physician  [] Other:    Prognosis for POC: [x] Good [] Fair  [] Poor    Patient requires continued skilled intervention: [x] Yes  [] No

## 2021-03-05 ENCOUNTER — APPOINTMENT (OUTPATIENT)
Dept: PHYSICAL THERAPY | Age: 56
End: 2021-03-05
Payer: COMMERCIAL

## 2021-03-10 ENCOUNTER — HOSPITAL ENCOUNTER (OUTPATIENT)
Dept: PHYSICAL THERAPY | Age: 56
Setting detail: THERAPIES SERIES
Discharge: HOME | End: 2021-03-10
Payer: COMMERCIAL

## 2021-03-10 ENCOUNTER — HOSPITAL ENCOUNTER (OUTPATIENT)
Dept: WOMENS IMAGING | Age: 56
Discharge: HOME | End: 2021-03-10
Payer: COMMERCIAL

## 2021-03-10 DIAGNOSIS — Z12.31 VISIT FOR SCREENING MAMMOGRAM: ICD-10-CM

## 2021-03-10 PROCEDURE — 97110 THERAPEUTIC EXERCISES: CPT

## 2021-03-10 PROCEDURE — 97140 MANUAL THERAPY 1/> REGIONS: CPT

## 2021-03-10 PROCEDURE — 77067 SCR MAMMO BI INCL CAD: CPT

## 2021-03-10 NOTE — FLOWSHEET NOTE
[facility] [address]  Phone: [phone]   Fax:     [phone]    Physical Therapy Treatment Note/ Progress Report:     Date:  3/10/2021    Patient Name:  Beryl De La Garza    :  1965  MRN: [id number]    Pertinent Medical History:      Medical/Treatment Diagnosis Information:  · Diagnosis: Low back pain radiating to right leg (M54.5   :  Treatment Diagnosis: Lumbar hypomobility with myofascial stiffness      Insurance/Certification information:     Physician Information:     Plan of care signed (Y/N):     Date of Patient follow up with Physician:      Progress Report: []  Yes  [x]  No     Date Range for reporting period:  Beginning: 3/10/2021  Ending:    Progress report due (10 Rx/or 30 days whichever is less): 88    Recertification due (POC duration/ or 90 days whichever is less):    Visit # POC/ Insurance Allowable Auth Needed   5 BOMN []Yes    [x]No     Functional Scale:       Date Assessed: at eval  Test: [place]= 22  Score:     Pain level:  0-5/10     History of Injury:    SUBJECTIVE:    2/10/21 Pt reports maybe a little less pain, was able to shovel snow without too much pain. 21 Patient reports back feeling a little better,some soreness in right hip area. 21 Patient reports back feels stiff in am.Overall is feeling looser. 3/10/21 Pt reports she is sleeping better and has a little less pain. OBJECTIVE:    Observation:    Test measurements:      RESTRICTIONS/PRECAUTIONS:     Exercises/Interventions:     Therapeutic Ex (48745)   Min: Resistance/Reps Notes/Cues   T slide in NS  Rows  bilat ext     PPT standing           Mat ex     PPT  March  Alt shld.  flex 5\" x 10  20 x   2# alt 20 x     Bridge 5\" 15 x     SL'ing hip abd      SL'ing trunk rotation R 30 x    Hip stretches   ITB   Rectus fem   30\" x 3   30\" x 3     Fig. 4 stretch 30\" x 3 Manual Intervention (17256) Min: 30 min    Stretch  To L/R QL/ ITB, piriformis , lumbar flexion via knee to chest , R QL (in SL'ing)    Mobs Lumbar  Flex L5-S1, Lateral flex L5 -S1    R hip distraction grade 4     DTM Bilateral dorsal LS pvm's               NMR re-education (40858)   Min:     Mf Activation- re-ed     TrA Re-ed activation     Glute Max re-ed activation          Therapeutic Activity (04509) Min:               Modalities  Min:             Other Therapeutic Activities:  Pt was educated on PT POC, Diagnosis, Prognosis, pathomechanics as well as frequency and duration of scheduling future physical therapy appointments. Time was also taken on this day to answer all patient questions and participation in PT. Reviewed appointment policy in detail with patient and patient verbalized understanding. Home Exercise Program:   HEP instruction: Access Code: [email or web address]   URL: [employer]/   Date: 02/03/2021   Prepared by: Ernst Crea     Exercises   · Hooklying Lumbar Rotation - 30 reps - 1-2 sets - 2x daily - 7x weekly   · Supine Single Knee to Chest Stretch - 2-4 reps - 1 sets - 30 hold - 2x daily - 7x weekly   · Supine Posterior Pelvic Tilt - 15 reps - 1 sets - 5 hold - 2x daily - 7x weekly   · Supine Piriformis Stretch with Foot on Ground - 2-4 reps - 1 sets - 30 hold - 2x daily - 7x weekly   The patient demonstrated good tolerance to and understanding of the HEP. Written instructions have been issued. 2/10/21 Reviewed HEP, good tolerance and clark  Access Code: [email or web address]   URL: [employer]/   Date: 03/03/2021   Prepared by: Christopher Osborne     Exercises   Scapular Retraction with Resistance - 10 reps - 3 sets - 3 hold - 1x daily - 7x weekly   Shoulder Extension with Resistance - 10 reps - 3 sets - 3 hold - 1x daily - 7x weekly        Therapeutic Exercise and NMR EXR  [] (93726) Provided verbal/tactile cueing for activities related to strengthening, flexibility, endurance, ROM  for improvements in proximal hip and core control with self care, mobility, lifting and ambulation.  [] (17739) Provided verbal/tactile cueing for activities related to improving balance, coordination, kinesthetic sense, posture, motor skill, proprioception  to assist with core control in self care, mobility, lifting, and ambulation. Therapeutic Activities:    [] (00207 or 09998) Provided verbal/tactile cueing for activities related to improving balance, coordination, kinesthetic sense, posture, motor skill, proprioception and motor activation to allow for proper function  with self care and ADLs  [] (66564) Provided training and instruction to the patient for proper core and proximal hip recruitment and positioning with ambulation re-education     Home Exercise Program:    [] (84209) Reviewed/Progressed HEP activities related to strengthening, flexibility, endurance, ROM of core, proximal hip and LE for functional self-care, mobility, lifting and ambulation   [] (29270) Reviewed/Progressed HEP activities related to improving balance, coordination, kinesthetic sense, posture, motor skill, proprioception of core, proximal hip and LE for self care, mobility, lifting, and ambulation      Manual Treatments:  PROM / STM / Oscillations-Mobs:  G-I, II, III, IV (PA's, Inf., Post.)  [] (54467) Provided manual therapy to mobilize proximal hip and LS spine soft tissue/joints for the purpose of modulating pain, promoting relaxation,  increasing ROM, reducing/eliminating soft tissue swelling/inflammation/restriction, improving soft tissue extensibility and allowing for proper ROM for normal function with self care, mobility, lifting and ambulation.      Charges:  Timed Code Treatment Minutes: 45   Total Treatment Minutes: 47     [] EVAL (LOW) 81884 (typically 20 minutes face-to-face)  [] EVAL (MOD) 24412 (typically 30 minutes face-to-face)  [] EVAL (HIGH) 83730 (typically 45 minutes face-to-face)  [] RE-EVAL     [x] CG(54216) x   1  [] Dry needle 1 or 2 Muscles (34400)  [] NMR (84494) x     [] Dry needle 3+ Muscles (29863)  [x] Manual (27195) x    2 [] Ultrasound (01045) x  [] TA (78486) x     [] Mech Traction (48705)  [] ES(attended) (12681)     [] ES (un) (55634):   [] Vasopump (30271) [] Ionto (60936)   [] Other:    GOALS:  Patient stated goal: \"learn what the problem is and exercises that help will help it'. []? Progressing: []? Met: []? Not Met: []? Adjusted  Therapist goals for Patient:   Short Term Goals: To be achieved in: 2 weeks  1. Independent in HEP and progression per patient tolerance, in order to prevent re-injury. []? Progressing: []? Met: []? Not Met: []? Adjusted  2. Patient will have a decrease in pain 0-3/10 to facilitate improvement in movement, function, and ADLs as indicated by Functional Deficits. []? Progressing: []? Met: []? Not Met: []? Adjusted     Long Term Goals: To be achieved in: 4-6 weeks  1. Disability index score of 11 or less for the [place] to assist with reaching prior level of function. []? Progressing: []? Met: []? Not Met: []? Adjusted  2. Patient will demonstrate increased AROM to WNL, good LS mobility, good hip ROM to allow for proper joint functioning as indicated by patients Functional Deficits. []? Progressing: []? Met: []? Not Met: []? Adjusted  3. Patient will have a decrease in pain 0-2/10 to facilitate improvement in movement, function, and ADLs as indicated by Functional Deficits. []? Progressing: []? Met: []? Not Met: []? Adjusted    ASSESSMENT:  See eval    Treatment/Activity Tolerance:  [x] Patient tolerated treatment well [] Patient limited by fatique  [] Patient limited by pain  [] Patient limited by other medical complications  [] Other:     Overall Progression Towards Functional goals/ Treatment Progress Update:  [x] Patient is progressing as expected towards functional goals listed. [] Progression is slowed due to complexities/Impairments listed.   []

## 2021-03-16 ENCOUNTER — IMMUNIZATION (OUTPATIENT)
Dept: PRIMARY CARE CLINIC | Age: 56
End: 2021-03-16
Payer: COMMERCIAL

## 2021-03-16 PROCEDURE — 0001A COVID-19, PFIZER VACCINE 30MCG/0.3ML DOSE: CPT | Performed by: FAMILY MEDICINE

## 2021-03-16 PROCEDURE — 91300 COVID-19, PFIZER VACCINE 30MCG/0.3ML DOSE: CPT | Performed by: FAMILY MEDICINE

## 2021-03-17 ENCOUNTER — HOSPITAL ENCOUNTER (OUTPATIENT)
Dept: PHYSICAL THERAPY | Age: 56
Setting detail: THERAPIES SERIES
Discharge: HOME | End: 2021-03-17
Payer: COMMERCIAL

## 2021-03-17 PROCEDURE — 97110 THERAPEUTIC EXERCISES: CPT

## 2021-03-17 PROCEDURE — 97140 MANUAL THERAPY 1/> REGIONS: CPT

## 2021-03-17 NOTE — FLOWSHEET NOTE
[address]le[address]  Phone: [phone]   Fax:     [phone]    Physical Therapy Treatment Note/ Progress Report:     Date:  3/17/2021    Patient Name:  Brit Kwok    :  1965  MRN: [id number]    Pertinent Medical History:      Medical/Treatment Diagnosis Information:  · Diagnosis: Low back pain radiating to right leg (M54.5   :  Treatment Diagnosis: Lumbar hypomobility with myofascial stiffness      Insurance/Certification information:     Physician Information:     Plan of care signed (Y/N):     Date of Patient follow up with Physician:      Progress Report: []  Yes  [x]  No     Date Range for reporting period:  Beginning: 3/17/2021  Ending:    Progress report due (10 Rx/or 30 days whichever is less): 42    Recertification due (POC duration/ or 90 days whichever is less):    Visit # POC/ Insurance Allowable Auth Needed   6 BOMN []Yes    [x]No     Functional Scale:       Date Assessed: at eval  Test: [place]= 22  Score:     Pain level:  0-5/10     History of Injury:    SUBJECTIVE:    2/10/21 Pt reports maybe a little less pain, was able to shovel snow without too much pain. 21 Patient reports back feeling a little better,some soreness in right hip area. 21 Patient reports back feels stiff in am.Overall is feeling looser. 3/10/21 Pt reports she is sleeping better and has a little less pain. 3/16/21 Pt reports her back is feeling a little stiff but not really pain. OBJECTIVE:    Observation:    Test measurements:  3/17/21 TLS ranges all wfl's and no c/o pain. Quadrant test negative.     RESTRICTIONS/PRECAUTIONS:     Exercises/Interventions:     Therapeutic Ex (06073)   Min: Resistance/Reps Notes/Cues   T slide in NS  Rows  bilat ext     PPT standing      Reformer   NS squats  Pull down  saws   3 sp x 30  2 sp 10 x 2  2 sp 20x ea L/R  Pt training required                   Mat ex     PPT  March  Alt shld. flex 5\" x 10  20 x   2# alt 20 x     Bridge 5\" 15 x     SL'ing hip abd      SL'ing trunk rotation R 20 x    Hip stretches   ITB   Rectus fem   30\" x 3   30\" x 3     Fig. 4 stretch 30\" x 3                   Manual Intervention (17933) Min: 30 min    Stretch  To L/R QL/ ITB, piriformis , lumbar flexion via knee to chest , R QL (in SL'ing)    Mobs Lumbar  Flex L5-S1, Lateral flex L5 -S1    R hip distraction grade 4     DTM Bilateral dorsal LS pvm's               NMR re-education (17688)   Min:     Mf Activation- re-ed     TrA Re-ed activation     Glute Max re-ed activation          Therapeutic Activity (48222) Min:               Modalities  Min:             Other Therapeutic Activities:  Pt was educated on PT POC, Diagnosis, Prognosis, pathomechanics as well as frequency and duration of scheduling future physical therapy appointments. Time was also taken on this day to answer all patient questions and participation in PT. Reviewed appointment policy in detail with patient and patient verbalized understanding. Home Exercise Program:   HEP instruction: Access Code: [email or web address]   URL: [employer]/   Date: 02/03/2021   Prepared by: Eb Santos     Exercises   · Hooklying Lumbar Rotation - 30 reps - 1-2 sets - 2x daily - 7x weekly   · Supine Single Knee to Chest Stretch - 2-4 reps - 1 sets - 30 hold - 2x daily - 7x weekly   · Supine Posterior Pelvic Tilt - 15 reps - 1 sets - 5 hold - 2x daily - 7x weekly   · Supine Piriformis Stretch with Foot on Ground - 2-4 reps - 1 sets - 30 hold - 2x daily - 7x weekly   The patient demonstrated good tolerance to and understanding of the HEP. Written instructions have been issued. 2/10/21 Reviewed HEP, good tolerance and clark  Access Code: [email or web address]   URL: [employer]/   Date: 03/03/2021   Prepared by: Davy Jackson     Exercises   Scapular Retraction with Resistance - 10 reps - 3 sets - 3 hold - 1x daily - 7x weekly   Shoulder Extension with Resistance - 10 reps - 3 sets - 3 hold - 1x daily - 7x weekly        Therapeutic Exercise and NMR EXR  [] (10522) Provided verbal/tactile cueing for activities related to strengthening, flexibility, endurance, ROM  for improvements in proximal hip and core control with self care, mobility, lifting and ambulation.  [] (14992) Provided verbal/tactile cueing for activities related to improving balance, coordination, kinesthetic sense, posture, motor skill, proprioception  to assist with core control in self care, mobility, lifting, and ambulation. Therapeutic Activities:    [] (64039 or 32025) Provided verbal/tactile cueing for activities related to improving balance, coordination, kinesthetic sense, posture, motor skill, proprioception and motor activation to allow for proper function  with self care and ADLs  [] (59401) Provided training and instruction to the patient for proper core and proximal hip recruitment and positioning with ambulation re-education     Home Exercise Program:    [] (80908) Reviewed/Progressed HEP activities related to strengthening, flexibility, endurance, ROM of core, proximal hip and LE for functional self-care, mobility, lifting and ambulation   [] (47044) Reviewed/Progressed HEP activities related to improving balance, coordination, kinesthetic sense, posture, motor skill, proprioception of core, proximal hip and LE for self care, mobility, lifting, and ambulation      Manual Treatments:  PROM / STM / Oscillations-Mobs:  G-I, II, III, IV (PA's, Inf., Post.)  [] (47768) Provided manual therapy to mobilize proximal hip and LS spine soft tissue/joints for the purpose of modulating pain, promoting relaxation,  increasing ROM, reducing/eliminating soft tissue swelling/inflammation/restriction, improving soft tissue extensibility and allowing for proper ROM for normal function with self care, mobility, lifting and ambulation. Charges:  Timed Code Treatment Minutes: 60   Total Treatment Minutes: 65     [] EVAL (LOW) 96600 (typically 20 minutes face-to-face)  [] EVAL (MOD) 48653 (typically 30 minutes face-to-face)  [] EVAL (HIGH) 11114 (typically 45 minutes face-to-face)  [] RE-EVAL     [x] XY(59902) x   2  [] Dry needle 1 or 2 Muscles (91901)  [] NMR (67862) x     [] Dry needle 3+ Muscles (29066)  [x] Manual (63642) x    2 [] Ultrasound (97214) x  [] TA (90125) x     [] Mech Traction (19479)  [] ES(attended) (41700)     [] ES (un) (83028):   [] Vasopump (88297) [] Ionto (37045)   [] Other:    GOALS:  Patient stated goal: \"learn what the problem is and exercises that help will help it'. []? Progressing: []? Met: []? Not Met: []? Adjusted  Therapist goals for Patient:   Short Term Goals: To be achieved in: 2 weeks  1. Independent in HEP and progression per patient tolerance, in order to prevent re-injury. []? Progressing: []? Met: []? Not Met: []? Adjusted  2. Patient will have a decrease in pain 0-3/10 to facilitate improvement in movement, function, and ADLs as indicated by Functional Deficits. []? Progressing: []? Met: []? Not Met: []? Adjusted     Long Term Goals: To be achieved in: 4-6 weeks  1. Disability index score of 11 or less for the [place] to assist with reaching prior level of function. []? Progressing: []? Met: []? Not Met: []? Adjusted  2. Patient will demonstrate increased AROM to WNL, good LS mobility, good hip ROM to allow for proper joint functioning as indicated by patients Functional Deficits. []? Progressing: []? Met: []? Not Met: []? Adjusted  3. Patient will have a decrease in pain 0-2/10 to facilitate improvement in movement, function, and ADLs as indicated by Functional Deficits. []? Progressing: []? Met: []? Not Met: []?  Adjusted    ASSESSMENT:  See eval    Treatment/Activity Tolerance:  [x] Patient tolerated treatment well [] Patient limited by fatique  [] Patient limited by pain  [] Patient limited by other medical complications  [] Other:     Overall Progression Towards Functional goals/ Treatment Progress Update:  [x] Patient is progressing as expected towards functional goals listed. [] Progression is slowed due to complexities/Impairments listed. [] Progression has been slowed due to co-morbidities. [] Plan just implemented, too soon to assess goals progression <30days   [] Goals require adjustment due to lack of progress  [] Patient is not progressing as expected and requires additional follow up with physician  [] Other:    Prognosis for POC: [x] Good [] Fair  [] Poor    Patient requires continued skilled intervention: [x] Yes  [] No        PLAN:                          ADD core ex  to HEP last Rx 3/24/21  Man Rx to Pelvis and lumbar spine (Mobs, MFR and MET), Review HEP, teach NS   [] Continue per plan of care [] Alter current plan (see comments)  [x] Plan of care initiated [] Hold pending MD visit [] Discharge    Electronically signed by: Govind Meeir, PT    Note: If patient does not return for scheduled/recommended follow up visits, this note will serve as a discharge from care along with the most recent update on progress.

## 2021-03-24 ENCOUNTER — HOSPITAL ENCOUNTER (OUTPATIENT)
Dept: PHYSICAL THERAPY | Age: 56
Setting detail: THERAPIES SERIES
Discharge: HOME | End: 2021-03-24
Payer: COMMERCIAL

## 2021-03-24 PROCEDURE — 97140 MANUAL THERAPY 1/> REGIONS: CPT

## 2021-03-24 PROCEDURE — 97110 THERAPEUTIC EXERCISES: CPT

## 2021-03-24 ASSESSMENT — PAIN SCALES - QUEBEC BACK PAIN DISABILITY SCALE
WALK SEVERAL KILOMETERS  OR MILES: 0
QUEBEC DISABILITY INDEX: 1-19%
STAND UP FOR 20 TO 30 MINUTES: 0
THROW A BALL: 0
RIDE IN A CAR: 2
TOTAL SCORE: 13
TAKE FOOD OUT OF THE REFRIGERATOR: 0
WALK A FEW BLOCKS OR 300 TO 400M: 0
REACH UP TO HIGH SHELVES: 0
QUEBEC CMS MODIFIER: CI
RUN ONE BLOCK OR 100M: 3
PUT ON SOCKS OR PANYHOSE: 1
TURN OVER IN BED: 0
CLIMB ONE FLIGHT OF STAIRS: 0
SLEEP THROUGH THE NIGHT: 2
MAKE YOUR BED: 0
BEND OVER TO CLEAN THE BATHTUB: 3
SIT IN A CHAIR FOR SEVERAL HOURS: 2
PULL OR PUSH HEAVY DOORS: 0
CARRY TWO BAGS OF GROCERIES: 0

## 2021-03-24 NOTE — FLOWSHEET NOTE
[address] [facility] [place]. Jass Uriarte [address]  Phone: [phone]   Fax:     [phone]     Physical Therapy Discharge Summary    Dear  ,Dr Martha Mccollum    We had the pleasure of treating the following patient for physical therapy services at [address] [place] [address]. A summary of our findings can be found in the discharge summary below. If you have any questions or concerns regarding these findings, please do not hesitate to contact me at the office phone number above.   Thank you for the referral.     Physician Signature:________________________________Date:__________________  By signing above (or electronic signature), therapists plan is approved by physician      Overall Response to Treatment:   []Patient is responding well to treatment and improvement is noted with regards  to goals   [x]Patient should continue to improve in reasonable time if they continue HEP   []Patient has plateaued and is no longer responding to skilled PT intervention    []Patient is getting worse and would benefit from return to referring MD   []Patient unable to adhere to initial POC   []Other:    Date range of Visits: 2-3-21 to 21  Total Visits: 6  Physical Therapy Treatment Note/ Progress Report:     Date:  3/24/2021    Patient Name:  Sita Sahu    :  1965  MRN: [id number]    Pertinent Medical History:      Medical/Treatment Diagnosis Information:  · Diagnosis: Low back pain radiating to right leg (M54.5   :  Treatment Diagnosis: Lumbar hypomobility with myofascial stiffness      Insurance/Certification information:     Physician Information:     Plan of care signed (Y/N):     Date of Patient follow up with Physician:      Progress Report: []  Yes  [x]  No     Date Range for reporting period:  Beginning: 3/24/2021  Ending:    Progress report due (10 Rx/or 30 days whichever is less): 11    Recertification due (POC duration/ or 90 days whichever is less):    Visit # POC/ Insurance Allowable Auth Needed   7 BOMN []Yes    [x]No     Functional Scale:       Date Assessed: at eval  Test: [place]= 22  Score:     Pain level:  0-1/10     History of Injury:    SUBJECTIVE:    2/10/21 Pt reports maybe a little less pain, was able to shovel snow without too much pain. 02/24/21 Patient reports back feeling a little better,some soreness in right hip area. 03/03/21 Patient reports back feels stiff in am.Overall is feeling looser. 3/10/21 Pt reports she is sleeping better and has a little less pain. 3/16/21 Pt reports her back is feeling a little stiff but not really pain. 3/24/21 Pt reports less pain over all but continues to have difficulty with static postures. She reports she is ready for today top be her last in PT.     OBJECTIVE:    Observation:    Test measurements:  3/24/21 TLS ranges all wfl's and no c/o pain. Quadrant test negative. RESTRICTIONS/PRECAUTIONS:     Exercises/Interventions:     Therapeutic Ex (77369)   Min: Resistance/Reps Notes/Cues   T slide in NS  Rows  bilat ext     PPT standing      Reformer   NS squats  Pull down  saws   3  Pt training required                   Mat ex     PPT  March  Alt shld.  flex 5\" x 10  20 x   2# alt 20 x     Bridge 5\" 15 x     SL'ing hip abd      SL'ing trunk rotation R 20 x    Hip stretches   ITB   Rectus fem   30\" x 3   30\" x 3     Fig. 4 stretch 30\" x 3                   Manual Intervention (59754) Min: 30 min    Stretch  To L/R QL/ ITB, piriformis , lumbar flexion via knee to chest , R QL (in SL'ing)    Mobs Lumbar  Flex L5-S1, Lateral flex L5 -S1    R hip distraction grade 4     DTM Bilateral dorsal LS pvm's     Mobs           NMR re-education (52090)   Min:     Mf Activation- re-ed     TrA Re-ed activation     Glute Max re-ed activation          Therapeutic Activity (15775) Min:               Modalities  Min:             Other Therapeutic Activities:  Pt was educated on PT POC, Diagnosis, Prognosis, pathomechanics as well as frequency and duration of scheduling future physical therapy appointments. Time was also taken on this day to answer all patient questions and participation in PT. Reviewed appointment policy in detail with patient and patient verbalized understanding. Home Exercise Program:   HEP instruction: Access Code: [email or web address]   URL: [email or web address]   Date: 02/03/2021   Prepared by: Cara Villatoro     Exercises   · Hooklying Lumbar Rotation - 30 reps - 1-2 sets - 2x daily - 7x weekly   · Supine Single Knee to Chest Stretch - 2-4 reps - 1 sets - 30 hold - 2x daily - 7x weekly   · Supine Posterior Pelvic Tilt - 15 reps - 1 sets - 5 hold - 2x daily - 7x weekly   · Supine Piriformis Stretch with Foot on Ground - 2-4 reps - 1 sets - 30 hold - 2x daily - 7x weekly   The patient demonstrated good tolerance to and understanding of the HEP. Written instructions have been issued. 2/10/21 Reviewed HEP, good tolerance and clark  Access Code: [email or web address]   URL: [employer]/   Date: 03/03/2021   Prepared by: Patricia Viveros     Exercises   Scapular Retraction with Resistance - 10 reps - 3 sets - 3 hold - 1x daily - 7x weekly   Shoulder Extension with Resistance - 10 reps - 3 sets - 3 hold - 1x daily - 7x weekly    Access Code: [email or web address]: [employer]/Date: 03/24/2021Prepared by: Emma Beyer BloomExercises   Supine Figure 4 Piriformis Stretch - 1 x daily - 3 x weekly - 1 sets - 4 reps - 30 hold   90/90 Lower Trunk Rotation - 1 x daily - 3 x weekly - 1 sets - 4 reps - 30 hold   Supine Lumbar Rotation Stretch - 1 x daily - 3 x weekly - 1 sets - 4 reps - 30 hold   Supine Bridge - 1 x daily - 3 x weekly - 1-2 sets - 10-15 reps - 5-10 hold   Sidelying Hip Circles - 1 x daily - 3 x weekly - 1-2 sets - 115-20 reps - 1 hold   Standing ITB Stretch - 1 x daily - 3 x weekly - 1 sets - 2-4 reps - 30 hold  The patient demonstrated good tolerance to and understanding of the HEP. Written instructions have been issued. Therapeutic Exercise and NMR EXR  [] (40353) Provided verbal/tactile cueing for activities related to strengthening, flexibility, endurance, ROM  for improvements in proximal hip and core control with self care, mobility, lifting and ambulation.  [] (37135) Provided verbal/tactile cueing for activities related to improving balance, coordination, kinesthetic sense, posture, motor skill, proprioception  to assist with core control in self care, mobility, lifting, and ambulation. Therapeutic Activities:    [] (08005 or 61681) Provided verbal/tactile cueing for activities related to improving balance, coordination, kinesthetic sense, posture, motor skill, proprioception and motor activation to allow for proper function  with self care and ADLs  [] (62545) Provided training and instruction to the patient for proper core and proximal hip recruitment and positioning with ambulation re-education     Home Exercise Program:    [] (65548) Reviewed/Progressed HEP activities related to strengthening, flexibility, endurance, ROM of core, proximal hip and LE for functional self-care, mobility, lifting and ambulation   [] (39605) Reviewed/Progressed HEP activities related to improving balance, coordination, kinesthetic sense, posture, motor skill, proprioception of core, proximal hip and LE for self care, mobility, lifting, and ambulation      Manual Treatments:  PROM / STM / Oscillations-Mobs:  G-I, II, III, IV (PA's, Inf., Post.)  [] (38103) Provided manual therapy to mobilize proximal hip and LS spine soft tissue/joints for the purpose of modulating pain, promoting relaxation,  increasing ROM, reducing/eliminating soft tissue swelling/inflammation/restriction, improving soft tissue extensibility and allowing for proper ROM for normal function with self care, mobility, lifting and ambulation.      Charges:  Timed Code Treatment Minutes: 45   Total Treatment Minutes: 47     [] EVAL (LOW) 44182 (typically 20 minutes face-to-face)  [] EVAL (MOD) 20773 (typically 30 minutes face-to-face)  [] EVAL (HIGH) 67544 (typically 45 minutes face-to-face)  [] RE-EVAL     [x] FW(84944) x   2  [] Dry needle 1 or 2 Muscles (72666)  [] NMR (36718) x     [] Dry needle 3+ Muscles (11728)  [x] Manual (02958) x    1 [] Ultrasound (40608) x  [] TA (87196) x     [] Mech Traction (70603)  [] ES(attended) (74784)     [] ES (un) (03172):   [] Vasopump (11612) [] Ionto (78281)   [] Other:    GOALS:  Patient stated goal: \"learn what the problem is and exercises that help will help it'. []? Progressing: [x]? Met: []? Not Met: []? Adjusted  Therapist goals for Patient:   Short Term Goals: To be achieved in: 2 weeks  1. Independent in HEP and progression per patient tolerance, in order to prevent re-injury. []? Progressing: [x]? Met: []? Not Met: []? Adjusted  2. Patient will have a decrease in pain 0-3/10 to facilitate improvement in movement, function, and ADLs as indicated by Functional Deficits. []? Progressing: [x]? Met: []? Not Met: []? Adjusted     Long Term Goals: To be achieved in: 4-6 weeks  1. Disability index score [facility] 11 or less [facility] to assist with reaching prior level of function. []? Progressing: []? Met: []? Not Met: [x]? Adjusted  2. Patient will demonstrate increased AROM to WNL, good LS mobility, good hip ROM to allow for proper joint functioning as indicated by patients Functional Deficits. []? Progressing: [x]? Met: []? Not Met: []? Adjusted  3. Patient will have a decrease in pain 0-2/10 to facilitate improvement in movement, function, and ADLs as indicated by Functional Deficits. []? Progressing: [x]? Met: []? Not Met: []?  Adjusted    ASSESSMENT:  See eval    Treatment/Activity Tolerance:  [x] Patient tolerated treatment well [] Patient limited by fatique  [] Patient limited by pain  [] Patient limited by other medical complications  [] Other:     Overall Progression Towards Functional goals/ Treatment Progress Update:  [x] Patient is progressing as expected towards functional goals listed. [] Progression is slowed due to complexities/Impairments listed. [] Progression has been slowed due to co-morbidities. [] Plan just implemented, too soon to assess goals progression <30days   [] Goals require adjustment due to lack of progress  [] Patient is not progressing as expected and requires additional follow up with physician  [] Other:    Prognosis for POC: [x] Good [] Fair  [] Poor    Patient requires continued skilled intervention: [x] Yes  [x] No        PLAN:                            Man Rx to Pelvis and lumbar spine (Mobs, MFR and MET), Review HEP, teach NS   [] Continue per plan of care [] Alter current plan (see comments)  [x] Plan of care initiated [] Hold pending MD visit [x] Discharge    Electronically signed by: Eric Desai PT    Note: If patient does not return for scheduled/recommended follow up visits, this note will serve as a discharge from care along with the most recent update on progress.

## 2021-04-06 ENCOUNTER — IMMUNIZATION (OUTPATIENT)
Dept: PRIMARY CARE CLINIC | Age: 56
End: 2021-04-06
Payer: COMMERCIAL

## 2021-04-06 PROCEDURE — 91300 COVID-19, PFIZER VACCINE 30MCG/0.3ML DOSE: CPT | Performed by: FAMILY MEDICINE

## 2021-04-06 PROCEDURE — 0002A COVID-19, PFIZER VACCINE 30MCG/0.3ML DOSE: CPT | Performed by: FAMILY MEDICINE

## 2021-04-22 RX ORDER — FLUTICASONE FUROATE 200 UG/1
POWDER RESPIRATORY (INHALATION)
Qty: 1 EACH | Refills: 2 | Status: SHIPPED | OUTPATIENT
Start: 2021-04-22 | End: 2021-04-26 | Stop reason: SDUPTHER

## 2021-04-26 ENCOUNTER — TELEPHONE (OUTPATIENT)
Dept: FAMILY MEDICINE CLINIC | Age: 56
End: 2021-04-26

## 2021-04-26 DIAGNOSIS — J45.40 MODERATE PERSISTENT ASTHMA, UNCOMPLICATED: Primary | ICD-10-CM

## 2021-04-26 RX ORDER — FLUTICASONE FUROATE 200 UG/1
POWDER RESPIRATORY (INHALATION)
Qty: 1 EACH | Refills: 2 | Status: SHIPPED | OUTPATIENT
Start: 2021-04-26 | End: 2021-07-19

## 2021-04-26 NOTE — TELEPHONE ENCOUNTER
Patient called stating pharmacy has not received refill for arnuity ellipta. patient is completely out of this medication. Says in chart says transmission to pharmacy failed.    Pharmacy verified [facility]   Please send prescription   Please advise

## 2021-05-04 DIAGNOSIS — L71.9 ROSACEA: ICD-10-CM

## 2021-05-04 RX ORDER — SPIRONOLACTONE 25 MG/1
TABLET ORAL
Qty: 90 TABLET | Refills: 0 | Status: SHIPPED | OUTPATIENT
Start: 2021-05-04 | End: 2021-07-20

## 2021-06-23 ENCOUNTER — OFFICE VISIT (OUTPATIENT)
Dept: FAMILY MEDICINE CLINIC | Age: 56
End: 2021-06-23
Payer: COMMERCIAL

## 2021-06-23 VITALS
OXYGEN SATURATION: 99 % | RESPIRATION RATE: 14 BRPM | SYSTOLIC BLOOD PRESSURE: 112 MMHG | WEIGHT: 149 LBS | DIASTOLIC BLOOD PRESSURE: 70 MMHG | BODY MASS INDEX: 25.44 KG/M2 | HEIGHT: 64 IN | HEART RATE: 85 BPM

## 2021-06-23 DIAGNOSIS — R73.9 HYPERGLYCEMIA: ICD-10-CM

## 2021-06-23 DIAGNOSIS — E78.1 HYPERTRIGLYCERIDEMIA: ICD-10-CM

## 2021-06-23 DIAGNOSIS — J45.40 MODERATE PERSISTENT ASTHMA WITHOUT COMPLICATION: ICD-10-CM

## 2021-06-23 DIAGNOSIS — E78.5 HYPERLIPIDEMIA LDL GOAL <160: Primary | ICD-10-CM

## 2021-06-23 DIAGNOSIS — E78.5 HYPERLIPIDEMIA LDL GOAL <160: ICD-10-CM

## 2021-06-23 DIAGNOSIS — R13.14 PHARYNGOESOPHAGEAL DYSPHAGIA: ICD-10-CM

## 2021-06-23 LAB
CHOLESTEROL, TOTAL: 247 MG/DL (ref 0–199)
HDLC SERPL-MCNC: 55 MG/DL (ref 40–60)
LDL CHOLESTEROL CALCULATED: 162 MG/DL
TRIGL SERPL-MCNC: 148 MG/DL (ref 0–150)
VLDLC SERPL CALC-MCNC: 30 MG/DL

## 2021-06-23 PROCEDURE — 99214 OFFICE O/P EST MOD 30 MIN: CPT | Performed by: FAMILY MEDICINE

## 2021-06-23 RX ORDER — OMEPRAZOLE 40 MG/1
CAPSULE, DELAYED RELEASE ORAL
Qty: 90 CAPSULE | Refills: 1 | Status: SHIPPED | OUTPATIENT
Start: 2021-06-23 | End: 2022-01-25

## 2021-06-23 ASSESSMENT — PATIENT HEALTH QUESTIONNAIRE - PHQ9
1. LITTLE INTEREST OR PLEASURE IN DOING THINGS: 0
SUM OF ALL RESPONSES TO PHQ QUESTIONS 1-9: 0
SUM OF ALL RESPONSES TO PHQ9 QUESTIONS 1 & 2: 0
SUM OF ALL RESPONSES TO PHQ QUESTIONS 1-9: 0
SUM OF ALL RESPONSES TO PHQ QUESTIONS 1-9: 0
2. FEELING DOWN, DEPRESSED OR HOPELESS: 0

## 2021-06-23 NOTE — PATIENT INSTRUCTIONS
triglycerides. What should my cholesterol levels be? Total cholesterol level  Less than 200 is best.   200 to 239 is borderline high. 240 or more means you're at increased risk for heart disease. LDL cholesterol levels  Below 100 is ideal for people who have a higher risk of heart disease. 100 to 129 is near optimal.   130 to 159 is borderline high. 160 or more means you're at a higher risk for heart disease. HDL cholesterol levels  Less than 40 means you're at higher risk for heart disease. 61 or higher greatly reduces your risk of heart disease. Triglycerides  Less than 150 mg/dL is best     Causes & Risk Factors   Risk factors for heart disease  Already had a heart attack   A man, 39years of age or older   A woman, 54years of age or older   A woman who is going through menopause or has completed menopause   Have an immediate family member (parent or sibling) who has had heart disease   Cigarette smoking   High blood pressure or diabetes   Overweight or obese   Inactive     Diagnosis & Tests   When should I start having my cholesterol level checked? You cant tell if you have high cholesterol without having it checked. All adults 21years of age and older should have their cholesterol checked every 5 years. If your cholesterol level is high or you have other risk factors for heart disease you may need to have it checked sooner and more often. A blood test known as a lipid panel is usually the way cholesterol is checked. Treatment   What can I do to improve my cholesterol level? If you have high cholesterol, it may be necessary for you to make some lifestyle changes. If you smoke, quit. Exercise regularly. If you're overweight, losing just 5 to 10 pounds can help improve your cholesterol levels. Make sure to eat plenty of fruits, vegetables, whole grains and fish- all of which promote heart health. Avoid saturated and trans fats, which can raise cholesterol levels.  Also limit your overall cholesterol intake to less than 300 milligrams per day and 200 milligrams if you have heart disease. What about medicine to lower cholesterol? Depending on your risk factors, if healthy eating and exercise don't work to lower your cholesterol level, your doctor may suggest medicine. Complications   Why is a high cholesterol level unhealthy? While some cholesterol is needed for good health, too much cholesterol in your blood can increase your risk for heart disease, including heart attack or stroke. If you have high cholesterol, your body may store the extra cholesterol in your arteries. Your arteries are blood vessels that carry blood from your heart to the rest of your body. Buildup of cholesterol in your arteries is known as plaque. Over time, plaque can become hard and make your arteries narrow. Large deposits of plaque can completely block an artery. Cholesterol plaques can also split open, leading to formation of a blood clot that blocks the flow of blood. If an artery that supplies blood to the muscles in your heart becomes blocked, a heart attack can occur. If an artery that supplies blood to your brain becomes blocked, a stroke can occur.

## 2021-06-23 NOTE — PROGRESS NOTES
ELLIPTA) 200 MCG/ACT AEPB TAKE 1 PUFF BY MOUTH EVERY DAY Yes Tamara Lynne MD   albuterol sulfate HFA (PROAIR HFA) 108 (90 Base) MCG/ACT inhaler INHALE ONE PUFF BY MOUTH EVERY 4 HOURS AS NEEDED FOR WHEEZING Yes Tamara Lynne MD   loratadine (CLARITIN) 10 MG tablet Take 10 mg by mouth daily Yes Historical Provider, MD   Naproxen Sodium (ALEVE) 220 MG CAPS Take 1 tablet by mouth daily. Yes Tamara Lynne MD   Diphenhydramine-APAP, sleep, (TYLENOL PM EXTRA STRENGTH PO) Take  by mouth. 1/2 every night at bedtime  Yes Historical Provider, MD      Family History   Problem Relation Age of Onset    Diabetes Mother     High Blood Pressure Mother     Cancer Maternal Grandmother      Social History     Tobacco Use    Smoking status: Never Smoker    Smokeless tobacco: Never Used    Tobacco comment: congratulated on nonsmoking status. Substance Use Topics    Alcohol use:  Yes     Alcohol/week: 14.0 standard drinks     Types: 14 Standard drinks or equivalent per week     Comment: social     Drug use: No      LAST LABS  Cholesterol, Total   Date Value Ref Range Status   01/13/2021 264 (H) 0 - 199 mg/dL Final     LDL Calculated   Date Value Ref Range Status   01/13/2021 183 (H) <100 mg/dL Final     HDL   Date Value Ref Range Status   01/13/2021 63 (H) 40 - 60 mg/dL Final   02/09/2012 58 40 - 60 mg/dl Final     Triglycerides   Date Value Ref Range Status   01/13/2021 90 0 - 150 mg/dL Final     Lab Results   Component Value Date    GLUCOSE 170 (H) 01/13/2021     Lab Results   Component Value Date     (L) 01/13/2021    K 4.7 01/13/2021    CREATININE 0.7 01/13/2021     Lab Results   Component Value Date    WBC 7.0 02/09/2012    HGB 14.6 02/09/2012    HCT 43.5 02/09/2012    MCV 91.7 02/09/2012     02/09/2012     Lab Results   Component Value Date    ALT 20 01/13/2021    AST 18 01/13/2021    ALKPHOS 100 01/13/2021    BILITOT <0.2 01/13/2021     TSH (uIU/mL)   Date Value   08/01/2017 1.60     Lab Results   Component Value Date    LABA1C 5.5 01/13/2021      Objective:   PHYSICAL EXAM   /70   Pulse 85   Resp 14   Ht 5' 4\" (1.626 m)   Wt 149 lb (67.6 kg)   LMP 09/13/2018   SpO2 99%   BMI 25.58 kg/m²   BP Readings from Last 5 Encounters:   06/23/21 112/70   12/16/20 130/84   02/11/20 126/86   11/21/19 (!) 132/92   11/15/19 120/80     Wt Readings from Last 5 Encounters:   06/23/21 149 lb (67.6 kg)   12/16/20 158 lb (71.7 kg)   02/11/20 156 lb (70.8 kg)   11/21/19 153 lb (69.4 kg)   11/15/19 153 lb (69.4 kg)      GENERAL:   · well-developed, well-nourished, alert, no distress. EYES:   · External findings: lids and lashes normal and conjunctivae and sclerae normal  LUNGS:    · Breathing unlabored  · clear to auscultation bilaterally and good air movement  CARDIOVASC:   · regular rate and rhythm, S1, S2 normal. No murmur, click, rub or gallop  · LEGS:  Lower extremity edema: none    SKIN: warm and dry  PSYCH:    · Alert and oriented  · Normal reasoning, insight good  · Facial expressions full, mood appropriate  · No memory disturbance noted     Assessment and Plan:      Diagnosis Orders   1. Hyperlipidemia LDL goal <160  Lipid Panel  May need statin. Check on diet. 2. Pharyngoesophageal dysphagia  omeprazole (PRILOSEC) 40 MG delayed release capsule   3. Moderate persistent asthma without complication  Stable with current medications. No adjustments needed. Will continue to monitor. 4. Hypertriglyceridemia  Continue to monitor. 5. Hyperglycemia  Last A1c was OK. Hemoglobin A1C   Stable. Continue current Tx plan except for changes marked below. INSTRUCTIONS  NEXT APPOINTMENT: Please schedule annual complete physical (30 minutes) in 6 months. · PLEASE TAKE THIS FORM TO CHECK-OUT WINDOW TO SCHEDULE NEXT VISIT. · PLEASE GET BLOODWORK DRAWN TODAY ON FIRST FLOOR in 170. Take orders with you. RESULTS- most blood tests back in couple days. We will call you if any problems.   If bloodwork good, you will get letter in mail or notified thru [facility] [employer] (if signed up) within 2 weeks. If you do not, please call office. · Please get flu vaccine when available in fall. Can get either at this office or at stores such as [employer] and [employer].

## 2021-06-24 LAB
ESTIMATED AVERAGE GLUCOSE: 111.2 MG/DL
HBA1C MFR BLD: 5.5 %

## 2021-07-02 RX ORDER — PRAVASTATIN SODIUM 40 MG
40 TABLET ORAL DAILY
Qty: 90 TABLET | Refills: 1 | Status: SHIPPED | OUTPATIENT
Start: 2021-07-02 | End: 2022-05-11

## 2021-07-19 DIAGNOSIS — J45.40 MODERATE PERSISTENT ASTHMA, UNCOMPLICATED: ICD-10-CM

## 2021-07-19 RX ORDER — FLUTICASONE FUROATE 200 UG/1
POWDER RESPIRATORY (INHALATION)
Qty: 1 EACH | Refills: 2 | Status: SHIPPED | OUTPATIENT
Start: 2021-07-19 | End: 2021-11-05

## 2021-07-20 DIAGNOSIS — L71.9 ROSACEA: ICD-10-CM

## 2021-07-20 RX ORDER — SPIRONOLACTONE 25 MG/1
TABLET ORAL
Qty: 90 TABLET | Refills: 0 | Status: SHIPPED | OUTPATIENT
Start: 2021-07-20 | End: 2021-10-11

## 2021-08-02 ENCOUNTER — TELEPHONE (OUTPATIENT)
Dept: FAMILY MEDICINE CLINIC | Age: 56
End: 2021-08-02

## 2021-08-02 RX ORDER — MOMETASONE FUROATE 50 UG/1
2 SPRAY, METERED NASAL DAILY
Qty: 1 INHALER | Refills: 11 | Status: SHIPPED | OUTPATIENT
Start: 2021-08-02 | End: 2022-05-11

## 2021-08-02 NOTE — TELEPHONE ENCOUNTER
----- Message from Kavita Rojo sent at 7/31/2021 12:00 PM EDT -----  Subject: Message to Provider    QUESTIONS  Information for Provider? Patient's usual medication fluticasone is not   available within 20 miles due to supply constraints. Requesting a similar   medication to get the prescription filled. ---------------------------------------------------------------------------  --------------  Delfin Muse INFO  What is the best way for the office to contact you? OK to leave message on   voicemail  Preferred Call Back Phone Number? [phone]  ---------------------------------------------------------------------------  --------------  SCRIPT ANSWERS  Relationship to Patient?  Self

## 2021-09-21 ENCOUNTER — TELEPHONE (OUTPATIENT)
Dept: FAMILY MEDICINE CLINIC | Age: 56
End: 2021-09-21

## 2021-09-21 NOTE — TELEPHONE ENCOUNTER
----- Message from Dwight Phalen, MA sent at 9/20/2021  9:29 AM EDT -----  Subject: Message to Provider    QUESTIONS  Information for Provider? Patient was exposed to someone with Covid-19 on   Saturday and wants to know how to go about getting tested. Please call   her.  ---------------------------------------------------------------------------  --------------  CALL BACK INFO  What is the best way for the office to contact you? OK to leave message on   voicemail  Preferred Call Back Phone Number? [phone]  ---------------------------------------------------------------------------  --------------  SCRIPT ANSWERS  Relationship to Patient?  Self

## 2021-10-11 DIAGNOSIS — L71.9 ROSACEA: ICD-10-CM

## 2021-10-11 RX ORDER — SPIRONOLACTONE 25 MG/1
TABLET ORAL
Qty: 90 TABLET | Refills: 0 | Status: SHIPPED | OUTPATIENT
Start: 2021-10-11 | End: 2022-01-03

## 2021-11-04 DIAGNOSIS — J45.40 MODERATE PERSISTENT ASTHMA, UNCOMPLICATED: ICD-10-CM

## 2021-11-05 RX ORDER — FLUTICASONE FUROATE 200 UG/1
POWDER RESPIRATORY (INHALATION)
Qty: 90 EACH | Refills: 1 | Status: SHIPPED | OUTPATIENT
Start: 2021-11-05 | End: 2022-05-13

## 2022-01-02 DIAGNOSIS — L71.9 ROSACEA: ICD-10-CM

## 2022-01-03 RX ORDER — SPIRONOLACTONE 25 MG/1
TABLET ORAL
Qty: 90 TABLET | Refills: 0 | Status: SHIPPED | OUTPATIENT
Start: 2022-01-03 | End: 2022-04-18

## 2022-01-24 DIAGNOSIS — R13.14 PHARYNGOESOPHAGEAL DYSPHAGIA: ICD-10-CM

## 2022-01-25 RX ORDER — OMEPRAZOLE 40 MG/1
CAPSULE, DELAYED RELEASE ORAL
Qty: 90 CAPSULE | Refills: 1 | Status: SHIPPED | OUTPATIENT
Start: 2022-01-25 | End: 2022-06-16

## 2022-03-23 ENCOUNTER — HOSPITAL ENCOUNTER (OUTPATIENT)
Dept: WOMENS IMAGING | Age: 57
Discharge: HOME | End: 2022-03-23
Payer: COMMERCIAL

## 2022-03-23 DIAGNOSIS — Z12.31 BREAST CANCER SCREENING BY MAMMOGRAM: ICD-10-CM

## 2022-03-23 PROCEDURE — 77067 SCR MAMMO BI INCL CAD: CPT

## 2022-04-16 DIAGNOSIS — L71.9 ROSACEA: ICD-10-CM

## 2022-04-18 RX ORDER — SPIRONOLACTONE 25 MG/1
TABLET ORAL
Qty: 30 TABLET | Refills: 0 | Status: SHIPPED | OUTPATIENT
Start: 2022-04-18 | End: 2022-05-16

## 2022-04-18 NOTE — TELEPHONE ENCOUNTER
Please schedule fasting annual physical (30 minutes) in 1 months.   OK to have water and medications (except for diabetes medicines) Stella Crain or Candelaria Larson

## 2022-05-11 ENCOUNTER — OFFICE VISIT (OUTPATIENT)
Dept: FAMILY MEDICINE CLINIC | Age: 57
End: 2022-05-11
Payer: COMMERCIAL

## 2022-05-11 VITALS
HEART RATE: 96 BPM | HEIGHT: 64 IN | OXYGEN SATURATION: 98 % | SYSTOLIC BLOOD PRESSURE: 114 MMHG | DIASTOLIC BLOOD PRESSURE: 70 MMHG | BODY MASS INDEX: 25.95 KG/M2 | TEMPERATURE: 98.2 F | WEIGHT: 152 LBS | RESPIRATION RATE: 14 BRPM

## 2022-05-11 DIAGNOSIS — R73.9 HYPERGLYCEMIA: ICD-10-CM

## 2022-05-11 DIAGNOSIS — E78.5 HYPERLIPIDEMIA LDL GOAL <160: ICD-10-CM

## 2022-05-11 DIAGNOSIS — J45.40 MODERATE PERSISTENT ASTHMA, UNCOMPLICATED: ICD-10-CM

## 2022-05-11 DIAGNOSIS — Z00.00 ENCOUNTER FOR WELL ADULT EXAM WITHOUT ABNORMAL FINDINGS: Primary | ICD-10-CM

## 2022-05-11 DIAGNOSIS — H10.13 ALLERGIC CONJUNCTIVITIS OF BOTH EYES: ICD-10-CM

## 2022-05-11 PROCEDURE — 99396 PREV VISIT EST AGE 40-64: CPT

## 2022-05-11 RX ORDER — CROMOLYN SODIUM 40 MG/ML
1 SOLUTION/ DROPS OPHTHALMIC 4 TIMES DAILY
Qty: 10 ML | Refills: 0 | Status: SHIPPED | OUTPATIENT
Start: 2022-05-11

## 2022-05-11 RX ORDER — PROGESTERONE 100 MG/1
CAPSULE ORAL
COMMUNITY
Start: 2022-02-16

## 2022-05-11 ASSESSMENT — PATIENT HEALTH QUESTIONNAIRE - PHQ9
SUM OF ALL RESPONSES TO PHQ QUESTIONS 1-9: 4
2. FEELING DOWN, DEPRESSED OR HOPELESS: 0
7. TROUBLE CONCENTRATING ON THINGS, SUCH AS READING THE NEWSPAPER OR WATCHING TELEVISION: 0
SUM OF ALL RESPONSES TO PHQ QUESTIONS 1-9: 4
SUM OF ALL RESPONSES TO PHQ QUESTIONS 1-9: 4
1. LITTLE INTEREST OR PLEASURE IN DOING THINGS: 0
9. THOUGHTS THAT YOU WOULD BE BETTER OFF DEAD, OR OF HURTING YOURSELF: 0
5. POOR APPETITE OR OVEREATING: 0
SUM OF ALL RESPONSES TO PHQ9 QUESTIONS 1 & 2: 0
3. TROUBLE FALLING OR STAYING ASLEEP: 3
6. FEELING BAD ABOUT YOURSELF - OR THAT YOU ARE A FAILURE OR HAVE LET YOURSELF OR YOUR FAMILY DOWN: 0
10. IF YOU CHECKED OFF ANY PROBLEMS, HOW DIFFICULT HAVE THESE PROBLEMS MADE IT FOR YOU TO DO YOUR WORK, TAKE CARE OF THINGS AT HOME, OR GET ALONG WITH OTHER PEOPLE: 0
SUM OF ALL RESPONSES TO PHQ QUESTIONS 1-9: 4
8. MOVING OR SPEAKING SO SLOWLY THAT OTHER PEOPLE COULD HAVE NOTICED. OR THE OPPOSITE, BEING SO FIGETY OR RESTLESS THAT YOU HAVE BEEN MOVING AROUND A LOT MORE THAN USUAL: 0
4. FEELING TIRED OR HAVING LITTLE ENERGY: 1

## 2022-05-11 NOTE — PATIENT INSTRUCTIONS
Try eye drops for itchy eyes    Get fasting labs soon    Continue working on low fat diet, exercise and weight loss

## 2022-05-11 NOTE — PROGRESS NOTES
History and Physical      Josue Brooks  YOB: 1965    Date of Service:  5/11/2022    Chief Complaint:   Josue Brooks is a 61 y.o. female who presents for complete physical examination. HPI:  Preet Rock is here today for her annual physical.    She states that she is anxious about possible job change. Allergic rhinitis- flared up after travelling, but has mostly resolved. She takes loratidine daily. She feels like her eyes have been more irritated from her allergies. Has been having insomnia- GYN put her on progesterone to help with this. She has tried melatonin. Currently taking tylenol PM every night.     Wt Readings from Last 3 Encounters:   05/11/22 152 lb (68.9 kg)   06/23/21 149 lb (67.6 kg)   12/16/20 158 lb (71.7 kg)     BP Readings from Last 3 Encounters:   05/11/22 114/70   06/23/21 112/70   12/16/20 130/84       Patient Active Problem List   Diagnosis    Hemorrhoids, internal    Allergic conjunctivitis of both eyes    Hypertriglyceridemia    Rosacea    Moderate persistent asthma    Abnormal EKG- septal Q, no symptoms    Dysphagia- EGD with striations about 2009    Allergic rhinitis    Chronic low back pain    Trochanteric bursitis of left hip    Hyperglycemia    Hyperlipidemia LDL goal <160       Preventive Care:  Health Maintenance   Topic Date Due    Cervical cancer screen  Never done    Pneumococcal 0-64 years Vaccine (2 - PCV) 02/25/2017    Shingles vaccine (3 of 3) 02/10/2021    Depression Screen  06/23/2022    DTaP/Tdap/Td vaccine (2 - Td or Tdap) 04/18/2023    Breast cancer screen  03/23/2024    Diabetes screen  06/23/2024    Colorectal Cancer Screen  07/18/2024    Lipids  06/23/2026    Flu vaccine  Completed    COVID-19 Vaccine  Completed    Hepatitis C screen  Completed    HIV screen  Completed    Hepatitis A vaccine  Aged Out    Hepatitis B vaccine  Aged Out    Hib vaccine  Aged Out    Meningococcal (ACWY) vaccine  Aged Out      Hx abnormal PAP: no   Self-breast exams: yes  Previous DEXA scan: no  Last eye exam: unknown  Exercise: walks some  Seatbelt use: always  Lipid panel:   Lab Results   Component Value Date    CHOL 247 (H) 06/23/2021    TRIG 148 06/23/2021    HDL 55 06/23/2021    LDLCALC 162 (H) 06/23/2021     The 10-year ASCVD risk score (Kasey Rocha, et al., 2013) is: 2.6%    Values used to calculate the score:      Age: 61 years      Sex: Female      Is Non- : No      Diabetic: No      Tobacco smoker: No      Systolic Blood Pressure: 838 mmHg      Is BP treated: No      HDL Cholesterol: 55 mg/dL      Total Cholesterol: 247 mg/dL      Immunization History   Administered Date(s) Administered    COVID-19, Moderna, Primary or Immunocompromised, PF, 100mcg/0.5mL 11/28/2021    COVID-19, Pfizer Purple top, DILUTE for use, 12+ yrs, 30mcg/0.3mL dose 03/16/2021, 04/06/2021    COVID-19, [place] Vaccine, Vaccine Unspecified 11/28/2021    Influenza Vaccine, unspecified formulation 10/09/2013, 10/22/2014    Influenza Virus Vaccine 11/11/2010, 10/02/2013, 10/01/2015, 10/22/2019    Influenza Whole 11/11/2010, 10/02/2013, 10/01/2015    Influenza, Quadv, IM, PF (6 mo and older Fluzone, Flulaval, Fluarix, and 3 yrs and older Afluria) 10/22/2019    Influenza, Quadv, Recombinant, IM PF (Flublok 18 yrs and older) 10/21/2020, 11/17/2021    Pneumococcal Polysaccharide ([id number]) 02/25/2016    Tdap (Boostrix, Adacel) 04/18/2013    Zoster Live (Zostavax) 08/21/2014    Zoster Recombinant (Shingrix) 12/16/2020       No Known Allergies  Outpatient Medications Marked as Taking for the 5/11/22 encounter (Office Visit) with SUSAN Cornell CNP   Medication Sig Dispense Refill    progesterone (PROMETRIUM) 100 MG CAPS capsule TAKE 1 CAPSULES BY ORAL ROUTE ONCE DAILY IN THE EVENING FOR 90      cromolyn (OPTICROM) 4 % ophthalmic solution Place 1 drop into both eyes 4 times daily 10 mL 0    spironolactone (ALDACTONE) 25 MG tablet TAKE 1 TABLET DAILY 30 tablet 0    omeprazole (PRILOSEC) 40 MG delayed release capsule TAKE 1 CAPSULE BY MOUTH EVERY DAY 90 capsule 1    ARNUITY ELLIPTA 200 MCG/ACT AEPB INHALE 1 PUFF BY MOUTH EVERY DAY 90 each 1    albuterol sulfate HFA (PROAIR HFA) 108 (90 Base) MCG/ACT inhaler INHALE ONE PUFF BY MOUTH EVERY 4 HOURS AS NEEDED FOR WHEEZING 1 Inhaler 2    loratadine (CLARITIN) 10 MG tablet Take 10 mg by mouth daily      Naproxen Sodium (ALEVE) 220 MG CAPS Take 1 tablet by mouth daily. 60 capsule     Diphenhydramine-APAP, sleep, (TYLENOL PM EXTRA STRENGTH PO) Take  by mouth. 1/2 every night at bedtime          Past Medical History:   Diagnosis Date    Acid reflux     Arthritis     Asthma     Rosacea      Past Surgical History:   Procedure Laterality Date     SECTION      COLONOSCOPY     [facility] ESOPHAGEAL [facility]ILATATION  2014    HAND SURGERY Right 2016    Excision of Right Ring Finger Digital Mucous cyst & DIP Joint Arthrotomy & Debridement     Family History   Problem Relation Age of Onset    Diabetes Mother     High Blood Pressure Mother     Cancer Maternal Grandmother      Social History     Socioeconomic History    Marital status:      Spouse name: Not on file    Number of children: 1    Years of education: Not on file    Highest education level: Not on file   Occupational History    Occupation:    Tobacco Use    Smoking status: Never Smoker    Smokeless tobacco: Never Used    Tobacco comment: congratulated on nonsmoking status. Substance and Sexual Activity    Alcohol use: Yes     Alcohol/week: 14.0 standard drinks     Types: 14 Standard drinks or equivalent per week     Comment: social     Drug use: No    Sexual activity: Yes   Other Topics Concern    Not on file   Social History Narrative    Exercise: walking daily, physical job. Lives with spouse. Seatbelt use: Always. Living will:  yes,   copy on file.         Social Determinants of Health     Financial Resource Strain:     Difficulty of Paying Living Expenses: Not on file   Food Insecurity:     Worried About Running Out of Food in the Last Year: Not on file    Page of Food in the Last Year: Not on file   Transportation Needs:     Lack of Transportation (Medical): Not on file    Lack of Transportation (Non-Medical): Not on file   Physical Activity:     Days of Exercise per Week: Not on file    Minutes of Exercise per Session: Not on file   Stress:     Feeling of Stress : Not on file   Social Connections:     Frequency of Communication with Friends and Family: Not on file    Frequency of Social Gatherings with Friends and Family: Not on file    Attends Restorationism Services: Not on file    Active Member of [address] [place] [address] [employer] or Organizations: Not on file    Attends Club or Organization Meetings: Not on file    Marital Status: Not on file   Intimate Partner Violence:     Fear of Current or Ex-Partner: Not on file    Emotionally Abused: Not on file    Physically Abused: Not on file    Sexually Abused: Not on file   Housing Stability:     Unable to Pay for Housing in the Last Year: Not on file    Number of [place] in the Last Year: Not on file    Unstable Housing in the Last Year: Not on file       Review of Systems:  A comprehensive review of systems was negative except for what was noted in the HPI. Physical Exam:   Vitals:    05/11/22 1422   BP: 114/70   Site: Right Upper Arm   Position: Sitting   Cuff Size: Medium Adult   Pulse: 96   Resp: 14   Temp: 98.2 °F (36.8 °C)   TempSrc: Oral   SpO2: 98%   Weight: 152 lb (68.9 kg)   Height: 5' 4\" (1.626 m)     Body mass index is 26.09 kg/m². Constitutional: She is oriented to person, place, and time. She appears well-developed and well-nourished. No distress. HEENT:   Head: Normocephalic and atraumatic.    Right Ear: Tympanic membrane, external ear and ear canal normal.   Left Ear: Tympanic membrane, external ear and ear canal normal.   Nose: Nose normal.   Mouth/Throat: Oropharynx is clear and moist, and mucous membranes are normal.  There is no cervical adenopathy. Eyes: Conjunctivae and extraocular motions are normal. Pupils are equal, round, and reactive to light. Neck: Neck supple. No JVD present. Carotid bruit is not present. No mass and no thyromegaly present. Cardiovascular: Normal rate, regular rhythm, normal heart sounds and intact distal pulses. Exam reveals no gallop and no friction rub. No murmur heard. Pulmonary/Chest: Effort normal and breath sounds normal. No respiratory distress. She has no wheezes, rhonchi or rales. Abdominal: Soft, non-tender. Bowel sounds and aorta are normal. She exhibits no organomegaly, mass or bruit. Musculoskeletal: Normal range of motion, no synovitis. She exhibits no edema. Neurological: She is alert and oriented to person, place, and time. She has normal reflexes. No cranial nerve deficit. Coordination normal.   Skin: Skin is warm and dry. There is no rash or erythema. No suspicious lesions noted. Psychiatric: She has a normal mood and affect. Her speech is normal and behavior is normal. Judgment, cognition and memory are normal.     Assessment/Plan:    Hanley Essex was seen today for annual exam.    Diagnoses and all orders for this visit:    Encounter for well adult exam without abnormal findings  Discussed healthy lifestyle including diet and exercise. Allergic conjunctivitis of both eyes  OTC drops ineffective. Can try mast cell stabilizer for itching. Recommended trying ceterizine in place of loratidine  -     cromolyn (OPTICROM) 4 % ophthalmic solution; Place 1 drop into both eyes 4 times daily    Hyperlipidemia LDL goal <160  Refused statin  Discussed low fat diet/exercise/weight loss  Will get fasting labs  -     Lipid Panel; Future  -     Comprehensive Metabolic Panel;  Future    Hyperglycemia  Hyperglycemia in past- will check A1C  -     Hemoglobin A1C; Future  -     Comprehensive Metabolic Panel; Future    Moderate persistent asthma, uncomplicated  Stable      Return in about 6 months (around 11/11/2022).

## 2022-05-13 DIAGNOSIS — J45.40 MODERATE PERSISTENT ASTHMA, UNCOMPLICATED: ICD-10-CM

## 2022-05-13 RX ORDER — FLUTICASONE FUROATE 200 UG/1
POWDER RESPIRATORY (INHALATION)
Qty: 90 EACH | Refills: 1 | Status: SHIPPED | OUTPATIENT
Start: 2022-05-13 | End: 2022-08-05 | Stop reason: ALTCHOICE

## 2022-05-16 DIAGNOSIS — L71.9 ROSACEA: ICD-10-CM

## 2022-05-16 RX ORDER — SPIRONOLACTONE 25 MG/1
TABLET ORAL
Qty: 30 TABLET | Refills: 5 | Status: SHIPPED | OUTPATIENT
Start: 2022-05-16 | End: 2022-08-10 | Stop reason: SDUPTHER

## 2022-05-25 DIAGNOSIS — R73.9 HYPERGLYCEMIA: ICD-10-CM

## 2022-05-25 DIAGNOSIS — E78.5 HYPERLIPIDEMIA LDL GOAL <160: ICD-10-CM

## 2022-05-25 LAB
A/G RATIO: 1.6 (ref 1.1–2.2)
ALBUMIN SERPL-MCNC: 4.2 G/DL (ref 3.4–5)
ALP BLD-CCNC: 108 U/L (ref 40–129)
ALT SERPL-CCNC: 15 U/L (ref 10–40)
ANION GAP SERPL CALCULATED.3IONS-SCNC: 12 MMOL/L (ref 3–16)
AST SERPL-CCNC: 16 U/L (ref 15–37)
BILIRUB SERPL-MCNC: 0.4 MG/DL (ref 0–1)
BUN BLDV-MCNC: 17 MG/DL (ref 7–20)
CALCIUM SERPL-MCNC: 9.5 MG/DL (ref 8.3–10.6)
CHLORIDE BLD-SCNC: 101 MMOL/L (ref 99–110)
CHOLESTEROL, TOTAL: 237 MG/DL (ref 0–199)
CO2: 25 MMOL/L (ref 21–32)
CREAT SERPL-MCNC: 0.7 MG/DL (ref 0.6–1.1)
GFR AFRICAN AMERICAN: >60
GFR NON-AFRICAN AMERICAN: >60
GLUCOSE BLD-MCNC: 104 MG/DL (ref 70–99)
HDLC SERPL-MCNC: 44 MG/DL (ref 40–60)
LDL CHOLESTEROL CALCULATED: 161 MG/DL
POTASSIUM SERPL-SCNC: 4.7 MMOL/L (ref 3.5–5.1)
SODIUM BLD-SCNC: 138 MMOL/L (ref 136–145)
TOTAL PROTEIN: 6.9 G/DL (ref 6.4–8.2)
TRIGL SERPL-MCNC: 161 MG/DL (ref 0–150)
VLDLC SERPL CALC-MCNC: 32 MG/DL

## 2022-05-26 LAB
ESTIMATED AVERAGE GLUCOSE: 102.5 MG/DL
HBA1C MFR BLD: 5.2 %

## 2022-06-16 DIAGNOSIS — R13.14 PHARYNGOESOPHAGEAL DYSPHAGIA: Primary | ICD-10-CM

## 2022-06-16 RX ORDER — PANTOPRAZOLE SODIUM 20 MG/1
20 TABLET, DELAYED RELEASE ORAL
Qty: 30 TABLET | Refills: 0 | Status: SHIPPED | OUTPATIENT
Start: 2022-06-16 | End: 2022-09-06

## 2022-08-05 DIAGNOSIS — J45.40 MODERATE PERSISTENT ASTHMA, UNCOMPLICATED: Primary | ICD-10-CM

## 2022-08-10 DIAGNOSIS — R13.14 PHARYNGOESOPHAGEAL DYSPHAGIA: ICD-10-CM

## 2022-08-10 DIAGNOSIS — L71.9 ROSACEA: ICD-10-CM

## 2022-08-10 RX ORDER — SPIRONOLACTONE 25 MG/1
TABLET ORAL
Qty: 30 TABLET | Refills: 5 | Status: SHIPPED | OUTPATIENT
Start: 2022-08-10 | End: 2022-09-06

## 2022-08-10 RX ORDER — FLUTICASONE PROPIONATE 220 UG/1
AEROSOL, METERED RESPIRATORY (INHALATION)
Qty: 36 G | Refills: 3 | Status: SHIPPED | OUTPATIENT
Start: 2022-08-10

## 2022-08-15 RX ORDER — OMEPRAZOLE 40 MG/1
CAPSULE, DELAYED RELEASE ORAL
Qty: 90 CAPSULE | Refills: 3 | Status: SHIPPED | OUTPATIENT
Start: 2022-08-15

## 2022-08-15 NOTE — TELEPHONE ENCOUNTER
Was switched to [facility]. They will now cover the omeprazole so she prefers that. She did just  the Protonix so does not need right now  I told her we will escribe the omeprazole so that it will be on profile when she needs. Added note to script to not fill until pt requests.

## 2022-09-04 DIAGNOSIS — L71.9 ROSACEA: ICD-10-CM

## 2022-09-04 DIAGNOSIS — R13.14 PHARYNGOESOPHAGEAL DYSPHAGIA: ICD-10-CM

## 2022-09-06 RX ORDER — PANTOPRAZOLE SODIUM 20 MG/1
TABLET, DELAYED RELEASE ORAL
Qty: 30 TABLET | Refills: 5 | Status: SHIPPED | OUTPATIENT
Start: 2022-09-06

## 2022-09-06 RX ORDER — SPIRONOLACTONE 25 MG/1
TABLET ORAL
Qty: 30 TABLET | Refills: 5 | Status: SHIPPED | OUTPATIENT
Start: 2022-09-06

## 2022-11-23 ENCOUNTER — OFFICE VISIT (OUTPATIENT)
Dept: FAMILY MEDICINE CLINIC | Age: 57
End: 2022-11-23
Payer: COMMERCIAL

## 2022-11-23 VITALS
OXYGEN SATURATION: 98 % | WEIGHT: 155 LBS | BODY MASS INDEX: 26.46 KG/M2 | DIASTOLIC BLOOD PRESSURE: 80 MMHG | SYSTOLIC BLOOD PRESSURE: 122 MMHG | HEIGHT: 64 IN | HEART RATE: 83 BPM

## 2022-11-23 DIAGNOSIS — F43.21 GRIEVING: ICD-10-CM

## 2022-11-23 DIAGNOSIS — Z00.00 WELL ADULT HEALTH CHECK: Primary | ICD-10-CM

## 2022-11-23 DIAGNOSIS — B35.1 ONYCHOMYCOSIS: ICD-10-CM

## 2022-11-23 DIAGNOSIS — E78.5 HYPERLIPIDEMIA LDL GOAL <160: ICD-10-CM

## 2022-11-23 DIAGNOSIS — J45.40 MODERATE PERSISTENT ASTHMA WITHOUT COMPLICATION: ICD-10-CM

## 2022-11-23 PROCEDURE — 99396 PREV VISIT EST AGE 40-64: CPT | Performed by: FAMILY MEDICINE

## 2022-11-23 RX ORDER — TERBINAFINE HYDROCHLORIDE 250 MG/1
250 TABLET ORAL DAILY
Qty: 90 TABLET | Refills: 0 | Status: SHIPPED | OUTPATIENT
Start: 2022-11-23 | End: 2023-02-21

## 2022-11-23 NOTE — PATIENT INSTRUCTIONS
INSTRUCTIONS  NEXT APPOINTMENT: Please schedule fasting annual physical (30 minutes) in one year. OK to have water, black coffee and medications (except for diabetes medicines) with Dr. Zeina Sullivan or her NP, Lizz Ignacio. PLEASE TAKE THIS FORM TO CHECK-OUT WINDOW TO SCHEDULE NEXT VISIT. PLEASE GET FASTING BLOODWORK DRAWN one month AFTER starting lamisil. .  Lab is on first floor in [address]. Hours Monday to Friday 6:30 AM to 4 PM.    Get Shingrix #2 shingles vaccine at pharmacy (such as [facility] or [facility]) or schedule here with nurse. Take lamisil for 90 days and wait for nails to grow out. Patient Education      GRIEVING: FACING ILLNESS, DEATH AND OTHER LOSSES    What is grief? Grief is a normal, healthy response to a loss. It describes the emotions you feel when you lose someone or something important to you. People grieve for many different reasons, including the following:  Death of a loved one, including pets   Divorce or relationship changes, including friendships   Changes in your health or the health of a loved one   Losing a job or changes in [place] in your way of life, such as during [employer] or when moving to a new place. What are the stages of grief? When people talk about the stages of grief, they most often are talking about the 5 stages of grief identified by Tia Logan. Rosy Dobbs was a psychiatrist who studied how people who had been diagnosed with a terminal illness grieved the loss of their health. She identified the 5 stages of grief as:  Denial: This isnt happening. Not to me.    Anger: Godfrey Jose is this happening? Who is to blame?    Bargaining: Ill make a change in my life, if only this wont happen to me.    Depression: I just dont care anymore.    Acceptance: Im at peace with what is happening.    All of these feelings are normal. However, not everyone who is grieving experiences all of these emotions.  And not everyone experiences these emotions in the same order. It is also common to cycle back through some of these stages more than once. Grief can include many other emotions and even physical symptoms. What are the symptoms of grief? Grief can include both emotional and physical symptoms. There is a big overlap with symptoms of depression. For example, emotional symptoms may include the following:  Anger   Anxiety and panic attacks   Blame   Bargaining   Confusion   Denial   Disorganization   Fear   Guilt   Irritability   Loneliness   Numbness   Sadness   Shock   Physical symptoms of grief may include the following:  Crying spells   Diarrhea   Dizziness   Fast heartbeat   Feeling like theres a lump in your throat   Hallucinations (e.g., seeing images of the dead person)   Headaches   Hyperventilating   Nausea   Not feeling hungry   Restlessness   Shortness of breath   Sleeping problems   Tightness in your chest   Tiredness   Weight loss or gain     How do I deal with a loss? There is no right way to grieve. Everybody is different. Give yourself time to experience your loss in your own way, but remember to take care of yourself:  Talk about how you're feeling with others. Try to keep up with your daily tasks so you don't feel overwhelmed. Get enough sleep, eat a well-balanced diet and exercise regularly. Avoid alcohol. Alcohol can make you feel more depressed. Get back into your normal routine as soon as you can. Avoid making major decisions right away. Allow yourself to cry, to feel numb, to be angry, or to feel however you're feeling. Ask for help if you need it. How long does grief last?  You'll probably start to feel better in 6 to 8 weeks. The whole process can last anywhere from 6 months to 4 years. If you feel like you're having trouble dealing with your emotions, ask for help. People who can help include friends, family, clergy, a counselor or therapist, support groups, and your family doctor.     How do I tell the difference between normal grief and depression? The symptoms of grief and the symptoms of depression are quite similar. While its normal for you to feel sad after a loss, the feelings associated with grief should be temporary. If you dont start to feel better as time passes, if your feelings begin to disrupt your daily life, or if you start to think about hurting yourself or others, talk to your family doctor. These can be signs of depression. Your family doctor can help you treat depression so you can start to feel better. How do I know when Im starting to feel better? You may start to feel better in small ways. For example, you may find it's a little easier to get up in the morning, or you may have small bursts of energy. This is the time when you'll begin to reorganize your life around your loss or without your loved one. During this time, it may feel like you go through a series of ups and downs. You may feel better one day, but worse the next day. This is normal.  Eventually, youll begin to reinvest in other relationships and activities. During this time, it's normal to feel guilty or disloyal to your loved one because you're moving on to new relationships. It's also normal to relive some of your feelings of grief on birthdays, anniversaries, holidays, and during other special times. FUNGAL NAIL INFECTIONS    Overview   What is a fungal infection? A fingernail or toenail infection that is caused by a fungus is called onychomycosis (say: \"on-ee-koh-my-ko-sis\"). Toenails are more likely to become infected than fingernails. Symptoms   What are the symptoms of a fungal nail infection? Signs of a fungal nail infection include nails that are:  Discolored (usually white or yellow)   Brittle   Crumbly, or have rough, jagged edges   Thick    from the nail bed   Curled up or down, or are distorted in shape   You may also have pain or discomfort in the affected toes or fingers.     Causes & Risk Factors   Who gets fungal nail infections? Anyone can get a fungal nail infection, but these infections are more common in adults older than 61years of age. They are especially common in people who have diabetes or circulation problems. Men are more likely than women to get fungal nail infections. Why did I get a fungal nail infection? It may be hard to know where or how you got a fungal nail infection. A warm, wet place is a good place for a fungus to grow. If you often wear heavy work boots that make your feet warm and sweaty, a fungus can grow around your toenails. If you often walk barefoot in locker rooms, you can  a fungus from the warm, wet floors. People whose hands are often wet (for example, dishwashers in restaurants and professional house ) are more likely to get fungal fingernail infections. Sometimes several people in a family will get fungal infections in their nails at the same time. This can happen because their immune systems aren't able to fight off the infection very well or because the infection is being passed when they use the same towels. Diagnosis & Tests   How do I find out if I have a fungal nail infection? If you think you have a fungal infection in your fingernails or toenails, see your doctor. By looking carefully at your nails, your doctor might be able to tell if you have an infection. To be sure of what kind of infection you have, your doctor might scrape a little bit of tissue from your nail and send it to a lab. The test can tell if you have a fungal infection or another kind of infection. Treatment   How is a fungal nail infection treated? Several medicines can treat a fungal nail infection. Oral antifungal medicines help a new nail grow to replace the infected nail. You might need to take the antifungal medicine for 6 to 12 weeks. It depends on how severe the infection is.  Some of these oral antifungal medicines are not safe for people who have liver problems or a history of congestive heart failure. Be sure to let your doctor know if you have one of these conditions. Your doctor will decide which medicine is right for you. Topical treatments (creams and polish that you apply to the top of your nail) are also available. However, topical medicines alone usually do not cure fungal nail infections. What can I do to take care of my nails? Here are some things you can do to take care of your nails if you have a fungal infection:  Keep your nails cut short and file down any thick areas. Don't use the same nail mirta or file on healthy nails and infected nails. If you have your nails professionally manicured, you should bring your own nail files and trimmers from home. Wear waterproof gloves for wet work (such as washing dishes or floors). To protect your fingers, wear 100% cotton gloves for dry work. Wear 100% cotton socks. Change your socks when they are damp from sweat or if your feet get wet. Put on clean, dry socks every day. You can put over-the-counter antifungal foot powder inside your socks to help keep your feet dry. Wear shoes with good support and a wide toe area. Don't wear pointed shoes that press your toes together. Avoid walking barefoot in public areas, such as locker rooms. BLEPHARITIS    Blepharitis is swelling or inflammation of the eyelids, usually where the eyelash hair follicles are located. Causes  In people with blepharitis, too much oil is produced by the glands near the eyelid. The exact reason for this problem is not known.  Blepharitis is more likely to be seen with:  A skin condition called seborrheic dermatitis or seborrhea, which often involves the scalp, eyebrows, eyelids, behind the ears, and creases of the nose   Allergies and lice that affect the eyelashes (less common)   Excess growth of the bacteria that are normally found on the skin   Rosacea -- a skin condition that makes the face turn red   Blepharitis may be linked to repeated styes and chalazia. Symptoms  The eyelids appear red and irritated, with scales that stick to the base of the eyelashes. The eyelids may be:  Burning   Crusty   Itching   Reddened   Swollen   You may feel like you have sand or dust in your eye when you blink. Sometimes, the eyelashes may fall out and the eyelids may become scarred. Exams and Tests  An examination of the eyelids during an eye examination is usually enough to diagnose blepharitis. Treatment  Careful daily cleansing of the eyelid edges helps remove the skin oils that cause bacteria to grow too much. Your health care provider might recommend using baby shampoo or special cleansers. Antibiotic ointments may also be helpful. If you have blepharitis:  Apply warm compresses to your eyes for 5 minutes, at least two times per day. Using a cotton swab, gently rub a solution of warm water and no-tears baby shampoo along your eyelid where the lash meets the lid. Do this in the morning and before you go to bed. [place] (Prognosis)  The likely outcome is good with treatment. You may need to keep the eyelid clean to prevent repeated problems. Continuing treatment will make the eyes less red and more comfortable. When to Contact a Medical Professional  Call for an appointment with your health care provider if symptoms worsen or do not improve after careful cleansing of the eyelids for several days. Prevention  Cleaning eyelids carefully will help prevent blepharitis. If a specific skin condition is present, it should be treated.     Alternative Names  Eyelid inflammation

## 2022-11-23 NOTE — PROGRESS NOTES
CHRONIC CONDITION FOLLOW-UP     Assessment and Plan:      Diagnosis Orders   1. Well adult health check        2. Hyperlipidemia LDL goal <160        3. Moderate persistent asthma without complication        4. Onychomycosis        5. Grieving        Stable     Continue current Tx plan. Any changes marked below. INSTRUCTIONS  NEXT APPOINTMENT: Please schedule fasting annual physical (30 minutes) in one year. OK to have water, black coffee and medications (except for diabetes medicines) with Dr. Princess Whitmore or her NP, Colby Barajas. PLEASE TAKE THIS FORM TO CHECK-OUT WINDOW TO SCHEDULE NEXT VISIT. PLEASE GET FASTING BLOODWORK DRAWN one month AFTER starting lamisil. .  Lab is on first floor in [address]. Hours Monday to Friday 6:30 AM to 4 PM.    Get Shingrix #2 shingles vaccine at pharmacy (such as [facility] or [facility]) or schedule here with nurse. Take lamisil for 90 days and wait for nails to grow out. Subjective:      Chief Complaint   Patient presents with    Hyperlipidemia     6 mo f/u     Tashi Adams is an 61 y.o. female who presents for follow up    Complaints:   Insurance change and arnuity changed to flovent  Rare rescue inhaler use   last month. Seeing counselor thru hospice. Has family support. Left ankle tight and stiff. Fungal nail L grt toe. Some pain. CHART REVIEW   reports that she has never smoked. She has never used smokeless tobacco.  Health Maintenance Due   Topic Date Due    Shingles vaccine (3 of 3) 02/10/2021    COVID-19 Vaccine (5 - Booster for Pfizer series) 10/25/2022     Current Outpatient Medications   Medication Instructions    albuterol sulfate HFA (PROAIR HFA) 108 (90 Base) MCG/ACT inhaler INHALE ONE PUFF BY MOUTH EVERY 4 HOURS AS NEEDED FOR WHEEZING    cromolyn (OPTICROM) 4 % ophthalmic solution 1 drop, Both Eyes, 4 TIMES DAILY    Diphenhydramine-APAP, sleep, (TYLENOL PM EXTRA STRENGTH PO) Take  by mouth.  1/2 every night at bedtime     fluticasone (FLOVENT HFA) 220 MCG/ACT inhaler INHALE TWO PUFFS BY MOUTH TWICE A DAY    fluticasone 200 mcg, Inhalation, DAILY    loratadine (CLARITIN) 10 mg, Oral, DAILY    Naproxen Sodium 220 MG CAPS 1 tablet, DAILY    omeprazole (PRILOSEC) 40 MG delayed release capsule TAKE ONE CAPSULE BY MOUTH DAILY    pantoprazole (PROTONIX) 20 MG tablet TAKE 1 TABLET BY MOUTH EVERY DAY BEFORE BREAKFAST    progesterone (PROMETRIUM) 100 MG CAPS capsule TAKE 1 CAPSULES BY ORAL ROUTE ONCE DAILY IN THE EVENING FOR 90    spironolactone (ALDACTONE) 25 MG tablet TAKE 1 TABLET BY MOUTH EVERY DAY     LAST LABS  Lab Results   Component Value Date    LDLCALC 161 (H) 05/25/2022     Lab Results   Component Value Date    HDL 44 05/25/2022     Lab Results   Component Value Date    TRIG 161 (H) 05/25/2022     Lab Results   Component Value Date    ALT 15 05/25/2022    AST 16 05/25/2022    ALKPHOS 108 05/25/2022    BILITOT 0.4 05/25/2022     Lab Results   Component Value Date     05/25/2022    K 4.7 05/25/2022    CREATININE 0.7 05/25/2022     Lab Results   Component Value Date    LABGLOM >60 05/25/2022    LABGLOM >60 01/13/2021    LABGLOM >60 11/15/2019     Lab Results   Component Value Date    WBC 7.0 02/09/2012    HGB 14.6 02/09/2012     02/09/2012     TSH (uIU/mL)   Date Value   08/01/2017 1.60     Lab Results   Component Value Date    GLUCOSE 104 (H) 05/25/2022     Lab Results   Component Value Date    LABA1C 5.2 05/25/2022    LABA1C 5.5 06/23/2021    LABA1C 5.5 01/13/2021     Objective:   PHYSICAL EXAM   /80 (Site: Right Upper Arm, Position: Sitting, Cuff Size: Medium Adult)   Pulse 83   Ht 5' 4\" (1.626 m)   Wt 155 lb (70.3 kg)   LMP 09/13/2018   SpO2 98%   BMI 26.61 kg/m²   BP Readings from Last 5 Encounters:   11/23/22 122/80   05/11/22 114/70   06/23/21 112/70   12/16/20 130/84   02/11/20 126/86     Wt Readings from Last 5 Encounters:   11/23/22 155 lb (70.3 kg)   05/11/22 152 lb (68.9 kg)   06/23/21 149 lb (67.6 kg)   12/16/20 158 lb (71.7 kg)   02/11/20 156 lb (70.8 kg)      GENERAL:   well-developed, well-nourished, alert, no distress.      LUNGS:    Breathing unlabored  clear to auscultation bilaterally and good air movement  CARDIOVASC:   regular rate and rhythm  SKIN: warm and dry

## 2023-01-11 ENCOUNTER — NURSE ONLY (OUTPATIENT)
Dept: FAMILY MEDICINE CLINIC | Age: 58
End: 2023-01-11
Payer: COMMERCIAL

## 2023-01-11 DIAGNOSIS — Z23 NEED FOR SHINGLES VACCINE: Primary | ICD-10-CM

## 2023-01-11 PROCEDURE — 90750 HZV VACC RECOMBINANT IM: CPT | Performed by: FAMILY MEDICINE

## 2023-01-11 PROCEDURE — 90471 IMMUNIZATION ADMIN: CPT | Performed by: FAMILY MEDICINE

## 2023-01-16 ENCOUNTER — PATIENT MESSAGE (OUTPATIENT)
Dept: FAMILY MEDICINE CLINIC | Age: 58
End: 2023-01-16

## 2023-01-16 DIAGNOSIS — B35.1 ONYCHOMYCOSIS: ICD-10-CM

## 2023-01-16 RX ORDER — TERBINAFINE HYDROCHLORIDE 250 MG/1
250 TABLET ORAL DAILY
Qty: 90 TABLET | Refills: 0 | Status: SHIPPED | OUTPATIENT
Start: 2023-01-16 | End: 2023-04-16

## 2023-01-16 NOTE — TELEPHONE ENCOUNTER
From: Francis Wylie  To: Dr. Ruben Vanessa  Sent: 1/16/2023 10:25 AM EST  Subject: Lamisil prescription    Hi Dr. Ramone Ma! [facility], you prescribed Lamisil for my toenail fungus issue. You apparently sent it to [facility], from which I received a notice that I don't have prescription rights there (or some such). As the simplest course of action, I've updated my preferred pharmacy information in [employer] to now show the closest [facility] pharmacy to me, the one at [facility], (Phone [phone]). I called them and they said they do have Lamisil in stock. Could you switch my Lamisil prescription to them? Thanks much. You also wanted me to get labs 6 months after I started Lamisil, which of course now will be bumped a couple months. I guess I'll contact you in July to get something set up. Thank you!

## 2023-01-20 ENCOUNTER — TELEPHONE (OUTPATIENT)
Dept: FAMILY MEDICINE CLINIC | Age: 58
End: 2023-01-20

## 2023-01-20 NOTE — TELEPHONE ENCOUNTER
----- Message from Lacey Ellison sent at 1/20/2023  9:07 AM EST -----  Subject: Message to Provider    QUESTIONS  Information for Provider? Malathi Alfaro called on 01/20 @ 9:02 am. She   had Covid in December last year. She's over it now, but is still having   ongoing coughing and sinus infection. She's been taking musinex to treat   it, but her symptoms are still there (mild, but consistant) She's been   expereincing some ear-fullness (ears feeling like they're clogged) If   there is any medication that can help treat her symtpoms, please call back   to discuss and prescribe.  ---------------------------------------------------------------------------  --------------  Omelia Counter INFO  [phone]; OK to leave message on voicemail,OK to respond with electronic   message via [employer] portal (only for patients who have registered [employer]   account)  ---------------------------------------------------------------------------  --------------  SCRIPT ANSWERS  Relationship to Patient?  Self
Patient scheduled
She will need an appt to evaluate. Could someone call to set up and appt, Please.
Other

## 2023-01-23 ENCOUNTER — OFFICE VISIT (OUTPATIENT)
Dept: FAMILY MEDICINE CLINIC | Age: 58
End: 2023-01-23
Payer: COMMERCIAL

## 2023-01-23 VITALS
HEIGHT: 64 IN | HEART RATE: 82 BPM | TEMPERATURE: 98.2 F | WEIGHT: 155 LBS | DIASTOLIC BLOOD PRESSURE: 84 MMHG | OXYGEN SATURATION: 98 % | SYSTOLIC BLOOD PRESSURE: 122 MMHG | BODY MASS INDEX: 26.46 KG/M2

## 2023-01-23 DIAGNOSIS — B96.89 ACUTE BACTERIAL SINUSITIS: Primary | ICD-10-CM

## 2023-01-23 DIAGNOSIS — J01.90 ACUTE BACTERIAL SINUSITIS: Primary | ICD-10-CM

## 2023-01-23 PROCEDURE — 99213 OFFICE O/P EST LOW 20 MIN: CPT | Performed by: NURSE PRACTITIONER

## 2023-01-23 RX ORDER — CEFDINIR 300 MG/1
300 CAPSULE ORAL 2 TIMES DAILY
Qty: 20 CAPSULE | Refills: 0 | Status: SHIPPED | OUTPATIENT
Start: 2023-01-23 | End: 2023-02-02

## 2023-01-23 RX ORDER — FLUTICASONE FUROATE 200 UG/1
1 POWDER RESPIRATORY (INHALATION) DAILY
COMMUNITY

## 2023-01-23 ASSESSMENT — PATIENT HEALTH QUESTIONNAIRE - PHQ9
SUM OF ALL RESPONSES TO PHQ QUESTIONS 1-9: 0
1. LITTLE INTEREST OR PLEASURE IN DOING THINGS: 0
SUM OF ALL RESPONSES TO PHQ QUESTIONS 1-9: 0
SUM OF ALL RESPONSES TO PHQ9 QUESTIONS 1 & 2: 0
2. FEELING DOWN, DEPRESSED OR HOPELESS: 0
SUM OF ALL RESPONSES TO PHQ QUESTIONS 1-9: 0
SUM OF ALL RESPONSES TO PHQ QUESTIONS 1-9: 0

## 2023-01-23 ASSESSMENT — ENCOUNTER SYMPTOMS
WHEEZING: 0
SHORTNESS OF BREATH: 0
SORE THROAT: 0
ABDOMINAL PAIN: 0
RHINORRHEA: 1
SINUS PRESSURE: 1
COUGH: 1

## 2023-01-23 NOTE — PROGRESS NOTES
1/23/2023    This is a 61 y.o. female   Chief Complaint   Patient presents with    Ear Fullness     Ear fullness, had covid around 12/13, still having symptoms from that   . HPI  Patient reports that she had COVID-19 in mid December 2022. She continues with ear fullness and congestion. Continues to take mucinex. Sputum production is yellow. Positive for rhinorrhea, PND, cough,   Denies sore throat, shortness of breath, fever. Patient Active Problem List   Diagnosis    Hemorrhoids, internal    Allergic conjunctivitis of both eyes    Hypertriglyceridemia    Rosacea    Moderate persistent asthma    Abnormal EKG- septal Q, no symptoms    Dysphagia- EGD with striations about 2009    Allergic rhinitis    Chronic low back pain    Trochanteric bursitis of left hip    Hyperglycemia    Hyperlipidemia LDL goal <160          Current Outpatient Medications   Medication Sig Dispense Refill    fluticasone (ARNUITY ELLIPTA) 200 MCG/ACT AEPB Inhale 1 puff into the lungs daily      spironolactone (ALDACTONE) 25 MG tablet TAKE 1 TABLET BY MOUTH EVERY DAY 30 tablet 5    omeprazole (PRILOSEC) 40 MG delayed release capsule TAKE ONE CAPSULE BY MOUTH DAILY 90 capsule 3    albuterol sulfate HFA (PROAIR HFA) 108 (90 Base) MCG/ACT inhaler INHALE ONE PUFF BY MOUTH EVERY 4 HOURS AS NEEDED FOR WHEEZING 1 Inhaler 2    loratadine (CLARITIN) 10 MG tablet Take 10 mg by mouth daily      Naproxen Sodium 220 MG CAPS Take 1 tablet by mouth daily. 60 capsule     Diphenhydramine-APAP, sleep, (TYLENOL PM EXTRA STRENGTH PO) Take  by mouth.  1/2 every night at bedtime       terbinafine (LAMISIL) 250 MG tablet Take 1 tablet by mouth daily (Patient not taking: Reported on 1/23/2023) 90 tablet 0    progesterone (PROMETRIUM) 100 MG CAPS capsule TAKE 1 CAPSULES BY ORAL ROUTE ONCE DAILY IN THE EVENING FOR 90 (Patient not taking: Reported on 11/23/2022)      cromolyn (OPTICROM) 4 % ophthalmic solution Place 1 drop into both eyes 4 times daily (Patient not taking: Reported on 1/23/2023) 10 mL 0     No current facility-administered medications for this visit. No Known Allergies    Review of Systems   Constitutional:  Positive for activity change and fatigue. Negative for fever. HENT:  Positive for congestion, ear pain, postnasal drip, rhinorrhea and sinus pressure. Negative for ear discharge and sore throat. Respiratory:  Positive for cough. Negative for shortness of breath and wheezing. Cardiovascular:  Negative for chest pain. Gastrointestinal:  Negative for abdominal pain. Neurological:  Positive for headaches. Negative for dizziness. Vitals:    01/23/23 0807   BP: 122/84   Site: Right Upper Arm   Position: Sitting   Cuff Size: Medium Adult   Pulse: 82   Temp: 98.2 °F (36.8 °C)   TempSrc: Oral   SpO2: 98%   Weight: 155 lb (70.3 kg)   Height: 5' 4\" (1.626 m)       Body mass index is 26.61 kg/m². Wt Readings from Last 3 Encounters:   01/23/23 155 lb (70.3 kg)   11/23/22 155 lb (70.3 kg)   05/11/22 152 lb (68.9 kg)       BP Readings from Last 3 Encounters:   01/23/23 122/84   11/23/22 122/80   05/11/22 114/70       Physical Exam  Vitals and nursing note reviewed. Constitutional:       General: She is not in acute distress. Appearance: She is well-developed. HENT:      Head: Normocephalic and atraumatic. Right Ear: Tympanic membrane, ear canal and external ear normal. Tympanic membrane is not erythematous or bulging. Left Ear: Tympanic membrane, ear canal and external ear normal. Tympanic membrane is not erythematous or bulging. Nose: Congestion present. Mouth/Throat:      Pharynx: Uvula midline. Posterior oropharyngeal erythema present. No oropharyngeal exudate. Cardiovascular:      Rate and Rhythm: Normal rate and regular rhythm. Heart sounds: Normal heart sounds. No murmur heard. No friction rub. No gallop. Pulmonary:      Effort: Pulmonary effort is normal. No respiratory distress.       Breath sounds: Normal breath sounds. Musculoskeletal:      Cervical back: Neck supple. Lymphadenopathy:      Head:      Right side of head: No submandibular adenopathy. Left side of head: No submandibular adenopathy. Cervical:      Right cervical: No superficial cervical adenopathy. Left cervical: No superficial cervical adenopathy. Skin:     General: Skin is warm and dry. Neurological:      Mental Status: She is alert and oriented to person, place, and time. Psychiatric:         Behavior: Behavior normal.       Assessmentand Plan  Lily Coyle was seen today for ear fullness. Diagnoses and all orders for this visit:    Acute bacterial sinusitis  -     cefdinir (OMNICEF) 300 MG capsule; Take 1 capsule by mouth 2 times daily for 10 days  Should take an over-the-counter probiotic daily while on the antibiotic to help prevent antibiotic associated diarrhea. Increase fluids  Rest  Flonase 2 sprays each nostril daily   Mucinex BID PRN  Sinus rinse PRN  Humidifier qhs. Patient is to call if symptoms worsen or fail to improve within the week. Return if symptoms worsen or fail to improve.

## 2023-02-13 DIAGNOSIS — J45.40 MODERATE PERSISTENT ASTHMA WITHOUT COMPLICATION: Primary | ICD-10-CM

## 2023-02-13 RX ORDER — FLUTICASONE FUROATE 200 UG/1
1 POWDER RESPIRATORY (INHALATION) DAILY
Qty: 90 EACH | Refills: 3 | Status: SHIPPED | OUTPATIENT
Start: 2023-02-13

## 2023-03-22 DIAGNOSIS — L71.9 ROSACEA: ICD-10-CM

## 2023-03-22 RX ORDER — SPIRONOLACTONE 25 MG/1
TABLET ORAL
Qty: 90 TABLET | Refills: 2 | Status: SHIPPED | OUTPATIENT
Start: 2023-03-22

## 2023-03-23 RX ORDER — PANTOPRAZOLE SODIUM 20 MG/1
TABLET, DELAYED RELEASE ORAL
Qty: 90 TABLET | Refills: 3 | Status: SHIPPED | OUTPATIENT
Start: 2023-03-23

## 2023-03-29 ENCOUNTER — HOSPITAL ENCOUNTER (OUTPATIENT)
Dept: WOMENS IMAGING | Age: 58
Discharge: HOME | End: 2023-03-29
Payer: COMMERCIAL

## 2023-03-29 DIAGNOSIS — E78.5 HYPERLIPIDEMIA LDL GOAL <160: ICD-10-CM

## 2023-03-29 DIAGNOSIS — B35.1 ONYCHOMYCOSIS: ICD-10-CM

## 2023-03-29 DIAGNOSIS — Z12.31 VISIT FOR SCREENING MAMMOGRAM: ICD-10-CM

## 2023-03-29 LAB
ALBUMIN SERPL-MCNC: 4.5 G/DL (ref 3.4–5)
ALBUMIN/GLOB SERPL: 1.6 {RATIO} (ref 1.1–2.2)
ALP SERPL-CCNC: 104 U/L (ref 40–129)
ALT SERPL-CCNC: 36 U/L (ref 10–40)
ANION GAP SERPL CALCULATED.3IONS-SCNC: 10 MMOL/L (ref 3–16)
AST SERPL-CCNC: 16 U/L (ref 15–37)
BILIRUB SERPL-MCNC: 0.3 MG/DL (ref 0–1)
BUN SERPL-MCNC: 17 MG/DL (ref 7–20)
CALCIUM SERPL-MCNC: 9.9 MG/DL (ref 8.3–10.6)
CHLORIDE SERPL-SCNC: 101 MMOL/L (ref 99–110)
CHOLEST SERPL-MCNC: 266 MG/DL (ref 0–199)
CO2 SERPL-SCNC: 26 MMOL/L (ref 21–32)
CREAT SERPL-MCNC: 0.8 MG/DL (ref 0.6–1.1)
GFR SERPLBLD CREATININE-BSD FMLA CKD-EPI: >60 ML/MIN/{1.73_M2}
GLUCOSE SERPL-MCNC: 92 MG/DL (ref 70–99)
HDLC SERPL-MCNC: 55 MG/DL (ref 40–60)
LDLC SERPL CALC-MCNC: 180 MG/DL
POTASSIUM SERPL-SCNC: 4.9 MMOL/L (ref 3.5–5.1)
PROT SERPL-MCNC: 7.3 G/DL (ref 6.4–8.2)
SODIUM SERPL-SCNC: 137 MMOL/L (ref 136–145)
TRIGL SERPL-MCNC: 153 MG/DL (ref 0–150)
VLDLC SERPL CALC-MCNC: 31 MG/DL

## 2023-03-29 PROCEDURE — 77067 SCR MAMMO BI INCL CAD: CPT

## 2023-04-03 ENCOUNTER — PATIENT MESSAGE (OUTPATIENT)
Dept: FAMILY MEDICINE CLINIC | Age: 58
End: 2023-04-03

## 2023-12-13 ENCOUNTER — NURSE ONLY (OUTPATIENT)
Dept: FAMILY MEDICINE CLINIC | Age: 58
End: 2023-12-13

## 2023-12-13 ENCOUNTER — TELEPHONE (OUTPATIENT)
Dept: FAMILY MEDICINE CLINIC | Age: 58
End: 2023-12-13

## 2023-12-13 VITALS — SYSTOLIC BLOOD PRESSURE: 116 MMHG | DIASTOLIC BLOOD PRESSURE: 74 MMHG

## 2023-12-13 NOTE — TELEPHONE ENCOUNTER
Called the patient. She will come in office to have her BP checked. Patient has not been seen for over a year by Candace Ortiz.

## 2023-12-13 NOTE — TELEPHONE ENCOUNTER
Patient came in to office and had her BP checked.   Call routed to go with BP reading that was done in office at 4pm

## 2023-12-13 NOTE — TELEPHONE ENCOUNTER
Does physical exertion bring on the arm/neck pressure? Does she sweat or get shortness of breath at same time? Any chest pressure? Do we need to get her in sooner than 12/27 if suspicious for cardiac?

## 2023-12-13 NOTE — TELEPHONE ENCOUNTER
Pt had BP check at home stated it is 136/93 pt feels some pressure in arms neck but it is not major   Pt wants to know should this be a concern ? Pt is not on BP medication at this time she wants to schedule appointment ?  Pt wants to come in next week sometime      Please advise

## 2023-12-14 NOTE — TELEPHONE ENCOUNTER
Called pt and she doesn't have any of that.   She can't move appt up so she will see us on the 27th fyi

## 2023-12-27 ENCOUNTER — OFFICE VISIT (OUTPATIENT)
Dept: FAMILY MEDICINE CLINIC | Age: 58
End: 2023-12-27
Payer: COMMERCIAL

## 2023-12-27 VITALS
BODY MASS INDEX: 27.18 KG/M2 | TEMPERATURE: 98.3 F | DIASTOLIC BLOOD PRESSURE: 78 MMHG | OXYGEN SATURATION: 96 % | HEIGHT: 64 IN | WEIGHT: 159.2 LBS | HEART RATE: 86 BPM | RESPIRATION RATE: 18 BRPM | SYSTOLIC BLOOD PRESSURE: 124 MMHG

## 2023-12-27 DIAGNOSIS — E78.5 HYPERLIPIDEMIA LDL GOAL <160: ICD-10-CM

## 2023-12-27 DIAGNOSIS — R63.5 WEIGHT GAIN: ICD-10-CM

## 2023-12-27 DIAGNOSIS — J45.40 MODERATE PERSISTENT ASTHMA WITHOUT COMPLICATION: ICD-10-CM

## 2023-12-27 DIAGNOSIS — Z00.00 ENCOUNTER FOR WELL ADULT EXAM WITHOUT ABNORMAL FINDINGS: Primary | ICD-10-CM

## 2023-12-27 DIAGNOSIS — Z23 NEED FOR PNEUMOCOCCAL VACCINATION: ICD-10-CM

## 2023-12-27 PROBLEM — R73.9 HYPERGLYCEMIA: Status: RESOLVED | Noted: 2018-10-06 | Resolved: 2023-12-27

## 2023-12-27 LAB
ALBUMIN SERPL-MCNC: 4.6 G/DL (ref 3.4–5)
ALBUMIN/GLOB SERPL: 1.8 {RATIO} (ref 1.1–2.2)
ALP SERPL-CCNC: 105 U/L (ref 40–129)
ALT SERPL-CCNC: 21 U/L (ref 10–40)
ANION GAP SERPL CALCULATED.3IONS-SCNC: 10 MMOL/L (ref 3–16)
AST SERPL-CCNC: 18 U/L (ref 15–37)
BASOPHILS # BLD: 0.1 K/UL (ref 0–0.2)
BASOPHILS NFR BLD: 0.8 %
BILIRUB SERPL-MCNC: 0.3 MG/DL (ref 0–1)
BUN SERPL-MCNC: 17 MG/DL (ref 7–20)
CALCIUM SERPL-MCNC: 9.3 MG/DL (ref 8.3–10.6)
CHLORIDE SERPL-SCNC: 103 MMOL/L (ref 99–110)
CHOLEST SERPL-MCNC: 259 MG/DL (ref 0–199)
CO2 SERPL-SCNC: 28 MMOL/L (ref 21–32)
CREAT SERPL-MCNC: 0.7 MG/DL (ref 0.6–1.1)
DEPRECATED RDW RBC AUTO: 14 % (ref 12.4–15.4)
EOSINOPHIL # BLD: 0.8 K/UL (ref 0–0.6)
EOSINOPHIL NFR BLD: 9 %
GFR SERPLBLD CREATININE-BSD FMLA CKD-EPI: >60 ML/MIN/{1.73_M2}
GLUCOSE SERPL-MCNC: 106 MG/DL (ref 70–99)
HCT VFR BLD AUTO: 45.4 % (ref 36–48)
HDLC SERPL-MCNC: 53 MG/DL (ref 40–60)
HGB BLD-MCNC: 15.2 G/DL (ref 12–16)
LDLC SERPL CALC-MCNC: 172 MG/DL
LYMPHOCYTES # BLD: 1.8 K/UL (ref 1–5.1)
LYMPHOCYTES NFR BLD: 20.7 %
MCH RBC QN AUTO: 29.9 PG (ref 26–34)
MCHC RBC AUTO-ENTMCNC: 33.6 G/DL (ref 31–36)
MCV RBC AUTO: 89.1 FL (ref 80–100)
MONOCYTES # BLD: 0.8 K/UL (ref 0–1.3)
MONOCYTES NFR BLD: 9.1 %
NEUTROPHILS # BLD: 5.4 K/UL (ref 1.7–7.7)
NEUTROPHILS NFR BLD: 60.4 %
PLATELET # BLD AUTO: 306 K/UL (ref 135–450)
PMV BLD AUTO: 8.9 FL (ref 5–10.5)
POTASSIUM SERPL-SCNC: 4.5 MMOL/L (ref 3.5–5.1)
PROT SERPL-MCNC: 7.2 G/DL (ref 6.4–8.2)
SODIUM SERPL-SCNC: 141 MMOL/L (ref 136–145)
TRIGL SERPL-MCNC: 171 MG/DL (ref 0–150)
TSH SERPL DL<=0.005 MIU/L-ACNC: 1.75 UIU/ML (ref 0.27–4.2)
VLDLC SERPL CALC-MCNC: 34 MG/DL
WBC # BLD AUTO: 8.9 K/UL (ref 4–11)

## 2023-12-27 PROCEDURE — 99396 PREV VISIT EST AGE 40-64: CPT | Performed by: FAMILY MEDICINE

## 2023-12-27 NOTE — PATIENT INSTRUCTIONS
INSTRUCTIONS  NEXT APPOINTMENT: Please schedule check-up in 6 months with Dr. Sharon Glaser or her NP, Tc Craft. PLEASE TAKE THIS FORM TO CHECK-OUT WINDOW TO SCHEDULE NEXT VISIT. PLEASE GET BLOODWORK DRAWN TODAY ON FIRST FLOOR in 170. RESULTS- most blood tests back in couple days. We will call you if any problems. If bloodwork good, you will get letter in mail or notified thru [facility] (if signed up) within 2 weeks. If you do not, please call office. Get annual COVID vaccine once a year. Can get at the same time as flu shot OR separate from other vaccines by at least 2 weeks. Please get eye exam soon. 2 bites less of each serving. Do ankle stretches. Plan tetanus booster next visit. Please call gynecologist to schedule PAP smear. Ask GYN to fax result to Dr. Sharon Glaser at [phone]. Patient Education         Ankle Sprain Rehabilitation Exercises     As soon as you can tolerate pressure on the ball of your foot, begin stretching your ankle using the towel stretch. When this stretch is too easy, try the standing calf stretch and soleus stretch. Towel stretch: Sit on a hard surface with your injured leg stretched out in front of you. Loop a towel around the ball of your foot and pull the towel toward your body keeping your knee straight. Hold this position for 15 to 30 seconds then relax. Repeat 3 times. Standing calf stretch: Facing a wall, put your hands against the wall at about eye level. Keep the injured leg back, the uninjured leg forward, and the heel of your injured leg on the floor. Turn your injured foot slightly inward (as if you were pigeon-toed) as you slowly lean into the wall until you feel a stretch in the back of your calf. Hold for 15 to 30 seconds. Repeat 3 times. Do this exercise several times each day. Standing soleus stretch: Stand facing a wall with your hands at about chest level.  With both knees slightly bent and the injured foot back, gently lean into the wall until you feel a

## 2023-12-27 NOTE — PROGRESS NOTES
reasoning, insight good  Facial expressions full, mood appropriate  No memory disturbance noted  MUSCULOSKEL:    Gait normal, assistive device: none  No significant finger or nail findings  Spine symmetric, no deformities, no kyphosis

## 2023-12-28 DIAGNOSIS — E78.5 HYPERLIPIDEMIA LDL GOAL <160: Primary | ICD-10-CM

## 2023-12-28 DIAGNOSIS — R73.9 HYPERGLYCEMIA: ICD-10-CM

## 2024-02-10 ENCOUNTER — PATIENT MESSAGE (OUTPATIENT)
Dept: FAMILY MEDICINE CLINIC | Age: 59
End: 2024-02-10

## 2024-02-10 DIAGNOSIS — L60.0 INGROWING TOENAIL: Primary | ICD-10-CM

## 2024-02-12 NOTE — TELEPHONE ENCOUNTER
From: Dolores Marcelino  To: Dr. Erica Song  Sent: 2/10/2024 1:42 PM EST  Subject: Referral to podiatrist?    I have several concerns with my feet, primarily with regard to toenails. Several of them have become very curved to the point that I can't trim them, and may be ingrown somewhat. One of them is extremely thick to the point of not being able to trim it and I'm wondering if it needs to be removed. No doubt I have a toenail fungus, ew. I'm thinking perhaps I need to see a podiatrist. Do you agree and can you refer one? Thank you. I hope you are doing well, especially with your mother's situation.

## 2024-03-18 RX ORDER — PANTOPRAZOLE SODIUM 20 MG/1
TABLET, DELAYED RELEASE ORAL
Qty: 90 TABLET | Refills: 3 | Status: SHIPPED | OUTPATIENT
Start: 2024-03-18